# Patient Record
Sex: MALE | Race: WHITE | Employment: FULL TIME | URBAN - METROPOLITAN AREA
[De-identification: names, ages, dates, MRNs, and addresses within clinical notes are randomized per-mention and may not be internally consistent; named-entity substitution may affect disease eponyms.]

---

## 2022-05-30 ENCOUNTER — APPOINTMENT (OUTPATIENT)
Dept: CT IMAGING | Age: 52
DRG: 872 | End: 2022-05-30
Payer: COMMERCIAL

## 2022-05-30 ENCOUNTER — APPOINTMENT (OUTPATIENT)
Dept: GENERAL RADIOLOGY | Age: 52
DRG: 872 | End: 2022-05-30
Payer: COMMERCIAL

## 2022-05-30 ENCOUNTER — HOSPITAL ENCOUNTER (INPATIENT)
Age: 52
LOS: 3 days | Discharge: HOME | DRG: 872 | End: 2022-06-02
Attending: STUDENT IN AN ORGANIZED HEALTH CARE EDUCATION/TRAINING PROGRAM | Admitting: HOSPITALIST
Payer: COMMERCIAL

## 2022-05-30 DIAGNOSIS — N30.00 ACUTE CYSTITIS WITHOUT HEMATURIA: Primary | ICD-10-CM

## 2022-05-30 DIAGNOSIS — A41.9 SEPSIS WITHOUT ACUTE ORGAN DYSFUNCTION, DUE TO UNSPECIFIED ORGANISM (HCC): ICD-10-CM

## 2022-05-30 PROBLEM — N12 PYELONEPHRITIS: Status: ACTIVE | Noted: 2022-05-30

## 2022-05-30 LAB
A/G RATIO: 1.5 (ref 1.1–2.2)
ALBUMIN SERPL-MCNC: 4.4 G/DL (ref 3.4–5)
ALP BLD-CCNC: 80 U/L (ref 40–129)
ALT SERPL-CCNC: 14 U/L (ref 10–40)
ANION GAP SERPL CALCULATED.3IONS-SCNC: 12 MMOL/L (ref 3–16)
AST SERPL-CCNC: 14 U/L (ref 15–37)
BACTERIA: ABNORMAL /HPF
BASOPHILS ABSOLUTE: 0.1 K/UL (ref 0–0.2)
BASOPHILS RELATIVE PERCENT: 0.7 %
BILIRUB SERPL-MCNC: 1.2 MG/DL (ref 0–1)
BILIRUBIN URINE: NEGATIVE
BLOOD, URINE: ABNORMAL
BUN BLDV-MCNC: 10 MG/DL (ref 7–20)
CALCIUM SERPL-MCNC: 8.9 MG/DL (ref 8.3–10.6)
CHLORIDE BLD-SCNC: 105 MMOL/L (ref 99–110)
CLARITY: CLEAR
CO2: 20 MMOL/L (ref 21–32)
COLOR: YELLOW
CREAT SERPL-MCNC: 0.9 MG/DL (ref 0.9–1.3)
EOSINOPHILS ABSOLUTE: 0.1 K/UL (ref 0–0.6)
EOSINOPHILS RELATIVE PERCENT: 0.5 %
EPITHELIAL CELLS, UA: ABNORMAL /HPF (ref 0–5)
GFR AFRICAN AMERICAN: >60
GFR NON-AFRICAN AMERICAN: >60
GLUCOSE BLD-MCNC: 104 MG/DL (ref 70–99)
GLUCOSE URINE: NEGATIVE MG/DL
HCT VFR BLD CALC: 40.8 % (ref 40.5–52.5)
HEMOGLOBIN: 13.9 G/DL (ref 13.5–17.5)
INFLUENZA A: NOT DETECTED
INFLUENZA B: NOT DETECTED
KETONES, URINE: ABNORMAL MG/DL
LACTIC ACID, SEPSIS: 0.7 MMOL/L (ref 0.4–1.9)
LACTIC ACID: 1.7 MMOL/L (ref 0.4–2)
LEUKOCYTE ESTERASE, URINE: ABNORMAL
LIPASE: 24 U/L (ref 13–60)
LYMPHOCYTES ABSOLUTE: 0.9 K/UL (ref 1–5.1)
LYMPHOCYTES RELATIVE PERCENT: 6 %
MCH RBC QN AUTO: 28.9 PG (ref 26–34)
MCHC RBC AUTO-ENTMCNC: 34 G/DL (ref 31–36)
MCV RBC AUTO: 85.1 FL (ref 80–100)
MICROSCOPIC EXAMINATION: YES
MONOCYTES ABSOLUTE: 1 K/UL (ref 0–1.3)
MONOCYTES RELATIVE PERCENT: 6.7 %
NEUTROPHILS ABSOLUTE: 13.3 K/UL (ref 1.7–7.7)
NEUTROPHILS RELATIVE PERCENT: 86.1 %
NITRITE, URINE: NEGATIVE
PDW BLD-RTO: 13.2 % (ref 12.4–15.4)
PH UA: 7 (ref 5–8)
PLATELET # BLD: 147 K/UL (ref 135–450)
PMV BLD AUTO: 9.1 FL (ref 5–10.5)
POTASSIUM REFLEX MAGNESIUM: 3.8 MMOL/L (ref 3.5–5.1)
PROTEIN UA: ABNORMAL MG/DL
RBC # BLD: 4.8 M/UL (ref 4.2–5.9)
RBC UA: ABNORMAL /HPF (ref 0–4)
SARS-COV-2 RNA, RT PCR: NOT DETECTED
SODIUM BLD-SCNC: 137 MMOL/L (ref 136–145)
SPECIFIC GRAVITY UA: <=1.005 (ref 1–1.03)
TOTAL PROTEIN: 7.3 G/DL (ref 6.4–8.2)
URINE TYPE: ABNORMAL
UROBILINOGEN, URINE: 2 E.U./DL
WBC # BLD: 15.4 K/UL (ref 4–11)
WBC UA: ABNORMAL /HPF (ref 0–5)

## 2022-05-30 PROCEDURE — 83605 ASSAY OF LACTIC ACID: CPT

## 2022-05-30 PROCEDURE — 87186 SC STD MICRODIL/AGAR DIL: CPT

## 2022-05-30 PROCEDURE — 93005 ELECTROCARDIOGRAM TRACING: CPT | Performed by: NURSE PRACTITIONER

## 2022-05-30 PROCEDURE — 99285 EMERGENCY DEPT VISIT HI MDM: CPT

## 2022-05-30 PROCEDURE — 1200000000 HC SEMI PRIVATE

## 2022-05-30 PROCEDURE — 74177 CT ABD & PELVIS W/CONTRAST: CPT

## 2022-05-30 PROCEDURE — 6360000002 HC RX W HCPCS: Performed by: NURSE PRACTITIONER

## 2022-05-30 PROCEDURE — 2580000003 HC RX 258: Performed by: NURSE PRACTITIONER

## 2022-05-30 PROCEDURE — 87636 SARSCOV2 & INF A&B AMP PRB: CPT

## 2022-05-30 PROCEDURE — 6360000004 HC RX CONTRAST MEDICATION: Performed by: STUDENT IN AN ORGANIZED HEALTH CARE EDUCATION/TRAINING PROGRAM

## 2022-05-30 PROCEDURE — 85025 COMPLETE CBC W/AUTO DIFF WBC: CPT

## 2022-05-30 PROCEDURE — 81001 URINALYSIS AUTO W/SCOPE: CPT

## 2022-05-30 PROCEDURE — 83690 ASSAY OF LIPASE: CPT

## 2022-05-30 PROCEDURE — 96366 THER/PROPH/DIAG IV INF ADDON: CPT

## 2022-05-30 PROCEDURE — 80053 COMPREHEN METABOLIC PANEL: CPT

## 2022-05-30 PROCEDURE — 96365 THER/PROPH/DIAG IV INF INIT: CPT

## 2022-05-30 PROCEDURE — 6360000002 HC RX W HCPCS: Performed by: INTERNAL MEDICINE

## 2022-05-30 PROCEDURE — 87040 BLOOD CULTURE FOR BACTERIA: CPT

## 2022-05-30 PROCEDURE — 71045 X-RAY EXAM CHEST 1 VIEW: CPT

## 2022-05-30 PROCEDURE — 87077 CULTURE AEROBIC IDENTIFY: CPT

## 2022-05-30 PROCEDURE — 6370000000 HC RX 637 (ALT 250 FOR IP): Performed by: NURSE PRACTITIONER

## 2022-05-30 PROCEDURE — 36415 COLL VENOUS BLD VENIPUNCTURE: CPT

## 2022-05-30 PROCEDURE — 87086 URINE CULTURE/COLONY COUNT: CPT

## 2022-05-30 PROCEDURE — 2580000003 HC RX 258: Performed by: INTERNAL MEDICINE

## 2022-05-30 PROCEDURE — 6370000000 HC RX 637 (ALT 250 FOR IP): Performed by: INTERNAL MEDICINE

## 2022-05-30 RX ORDER — PROPRANOLOL HYDROCHLORIDE 60 MG/1
60 TABLET ORAL ONCE
COMMUNITY

## 2022-05-30 RX ORDER — LAMOTRIGINE 100 MG/1
100 TABLET ORAL DAILY
COMMUNITY

## 2022-05-30 RX ORDER — 0.9 % SODIUM CHLORIDE 0.9 %
30 INTRAVENOUS SOLUTION INTRAVENOUS ONCE
Status: COMPLETED | OUTPATIENT
Start: 2022-05-30 | End: 2022-05-30

## 2022-05-30 RX ORDER — M-VIT,TX,IRON,MINS/CALC/FOLIC 27MG-0.4MG
1 TABLET ORAL DAILY
Status: DISCONTINUED | OUTPATIENT
Start: 2022-05-30 | End: 2022-06-02 | Stop reason: HOSPADM

## 2022-05-30 RX ORDER — TRAMADOL HYDROCHLORIDE 50 MG/1
50 TABLET ORAL 2 TIMES DAILY
COMMUNITY

## 2022-05-30 RX ORDER — SODIUM CHLORIDE 0.9 % (FLUSH) 0.9 %
5-40 SYRINGE (ML) INJECTION EVERY 12 HOURS SCHEDULED
Status: DISCONTINUED | OUTPATIENT
Start: 2022-05-30 | End: 2022-06-02 | Stop reason: HOSPADM

## 2022-05-30 RX ORDER — LORATADINE 10 MG/1
10 CAPSULE, LIQUID FILLED ORAL DAILY
COMMUNITY

## 2022-05-30 RX ORDER — DICYCLOMINE HCL 20 MG
20 TABLET ORAL EVERY 6 HOURS
COMMUNITY

## 2022-05-30 RX ORDER — MONTELUKAST SODIUM 10 MG/1
10 TABLET ORAL NIGHTLY
COMMUNITY

## 2022-05-30 RX ORDER — TOPIRAMATE 25 MG/1
50 TABLET ORAL DAILY
Status: DISCONTINUED | OUTPATIENT
Start: 2022-05-30 | End: 2022-06-02 | Stop reason: HOSPADM

## 2022-05-30 RX ORDER — ONDANSETRON 4 MG/1
4 TABLET, ORALLY DISINTEGRATING ORAL EVERY 8 HOURS PRN
Status: DISCONTINUED | OUTPATIENT
Start: 2022-05-30 | End: 2022-06-02 | Stop reason: HOSPADM

## 2022-05-30 RX ORDER — MECLIZINE HYDROCHLORIDE 25 MG/1
25 TABLET ORAL 3 TIMES DAILY PRN
COMMUNITY

## 2022-05-30 RX ORDER — ACETAMINOPHEN 325 MG/1
650 TABLET ORAL EVERY 6 HOURS PRN
Status: DISCONTINUED | OUTPATIENT
Start: 2022-05-30 | End: 2022-06-02 | Stop reason: HOSPADM

## 2022-05-30 RX ORDER — ENOXAPARIN SODIUM 100 MG/ML
40 INJECTION SUBCUTANEOUS DAILY
Status: DISCONTINUED | OUTPATIENT
Start: 2022-05-30 | End: 2022-06-02 | Stop reason: HOSPADM

## 2022-05-30 RX ORDER — TRAMADOL HYDROCHLORIDE 50 MG/1
50 TABLET ORAL EVERY 6 HOURS PRN
Status: DISCONTINUED | OUTPATIENT
Start: 2022-05-30 | End: 2022-06-02 | Stop reason: HOSPADM

## 2022-05-30 RX ORDER — POLYETHYLENE GLYCOL 3350 17 G/17G
17 POWDER, FOR SOLUTION ORAL DAILY PRN
Status: DISCONTINUED | OUTPATIENT
Start: 2022-05-30 | End: 2022-06-02 | Stop reason: HOSPADM

## 2022-05-30 RX ORDER — ACETAMINOPHEN 650 MG/1
650 SUPPOSITORY RECTAL EVERY 6 HOURS PRN
Status: DISCONTINUED | OUTPATIENT
Start: 2022-05-30 | End: 2022-06-02 | Stop reason: HOSPADM

## 2022-05-30 RX ORDER — ACETAMINOPHEN 500 MG
1000 TABLET ORAL ONCE
Status: COMPLETED | OUTPATIENT
Start: 2022-05-30 | End: 2022-05-30

## 2022-05-30 RX ORDER — LEVOFLOXACIN 5 MG/ML
750 INJECTION, SOLUTION INTRAVENOUS EVERY 24 HOURS
Status: DISCONTINUED | OUTPATIENT
Start: 2022-05-30 | End: 2022-06-02

## 2022-05-30 RX ORDER — ONDANSETRON 2 MG/ML
4 INJECTION INTRAMUSCULAR; INTRAVENOUS EVERY 6 HOURS PRN
Status: DISCONTINUED | OUTPATIENT
Start: 2022-05-30 | End: 2022-06-02 | Stop reason: HOSPADM

## 2022-05-30 RX ORDER — TAMSULOSIN HYDROCHLORIDE 0.4 MG/1
0.4 CAPSULE ORAL DAILY
COMMUNITY

## 2022-05-30 RX ORDER — DICYCLOMINE HCL 20 MG
20 TABLET ORAL 4 TIMES DAILY PRN
Status: DISCONTINUED | OUTPATIENT
Start: 2022-05-30 | End: 2022-06-02 | Stop reason: HOSPADM

## 2022-05-30 RX ORDER — SODIUM CHLORIDE 9 MG/ML
INJECTION, SOLUTION INTRAVENOUS CONTINUOUS
Status: ACTIVE | OUTPATIENT
Start: 2022-05-30 | End: 2022-05-31

## 2022-05-30 RX ORDER — SODIUM CHLORIDE 9 MG/ML
INJECTION, SOLUTION INTRAVENOUS PRN
Status: DISCONTINUED | OUTPATIENT
Start: 2022-05-30 | End: 2022-06-02 | Stop reason: HOSPADM

## 2022-05-30 RX ORDER — SODIUM CHLORIDE 0.9 % (FLUSH) 0.9 %
5-40 SYRINGE (ML) INJECTION PRN
Status: DISCONTINUED | OUTPATIENT
Start: 2022-05-30 | End: 2022-06-02 | Stop reason: HOSPADM

## 2022-05-30 RX ORDER — MONTELUKAST SODIUM 10 MG/1
10 TABLET ORAL NIGHTLY
Status: DISCONTINUED | OUTPATIENT
Start: 2022-05-30 | End: 2022-06-02 | Stop reason: HOSPADM

## 2022-05-30 RX ORDER — TOPIRAMATE 25 MG/1
50 TABLET ORAL DAILY
COMMUNITY

## 2022-05-30 RX ORDER — TAMSULOSIN HYDROCHLORIDE 0.4 MG/1
0.4 CAPSULE ORAL DAILY
Status: DISCONTINUED | OUTPATIENT
Start: 2022-05-30 | End: 2022-06-02 | Stop reason: HOSPADM

## 2022-05-30 RX ORDER — M-VIT,TX,IRON,MINS/CALC/FOLIC 27MG-0.4MG
1 TABLET ORAL DAILY
COMMUNITY

## 2022-05-30 RX ADMIN — ENOXAPARIN SODIUM 40 MG: 100 INJECTION SUBCUTANEOUS at 18:55

## 2022-05-30 RX ADMIN — MULTIPLE VITAMINS W/ MINERALS TAB 1 TABLET: TAB at 18:55

## 2022-05-30 RX ADMIN — Medication 10 ML: at 18:44

## 2022-05-30 RX ADMIN — TAMSULOSIN HYDROCHLORIDE 0.4 MG: 0.4 CAPSULE ORAL at 18:55

## 2022-05-30 RX ADMIN — IOPAMIDOL 75 ML: 755 INJECTION, SOLUTION INTRAVENOUS at 15:28

## 2022-05-30 RX ADMIN — PIPERACILLIN AND TAZOBACTAM 3375 MG: 3; .375 INJECTION, POWDER, FOR SOLUTION INTRAVENOUS at 15:27

## 2022-05-30 RX ADMIN — SODIUM CHLORIDE: 9 INJECTION, SOLUTION INTRAVENOUS at 18:47

## 2022-05-30 RX ADMIN — ACETAMINOPHEN 1000 MG: 500 TABLET ORAL at 15:07

## 2022-05-30 RX ADMIN — MONTELUKAST SODIUM 10 MG: 10 TABLET, COATED ORAL at 20:11

## 2022-05-30 RX ADMIN — TRAMADOL HYDROCHLORIDE 50 MG: 50 TABLET ORAL at 18:55

## 2022-05-30 RX ADMIN — TOPIRAMATE 50 MG: 25 TABLET, FILM COATED ORAL at 18:55

## 2022-05-30 RX ADMIN — SODIUM CHLORIDE 2319 ML: 9 INJECTION, SOLUTION INTRAVENOUS at 15:04

## 2022-05-30 RX ADMIN — LEVOFLOXACIN 750 MG: 5 INJECTION, SOLUTION INTRAVENOUS at 18:57

## 2022-05-30 ASSESSMENT — PAIN SCALES - GENERAL
PAINLEVEL_OUTOF10: 9
PAINLEVEL_OUTOF10: 7
PAINLEVEL_OUTOF10: 8

## 2022-05-30 ASSESSMENT — PAIN DESCRIPTION - DESCRIPTORS: DESCRIPTORS: DULL;SHOOTING

## 2022-05-30 ASSESSMENT — LIFESTYLE VARIABLES
HOW OFTEN DO YOU HAVE A DRINK CONTAINING ALCOHOL: NEVER
HOW OFTEN DO YOU HAVE A DRINK CONTAINING ALCOHOL: NEVER

## 2022-05-30 ASSESSMENT — PAIN DESCRIPTION - LOCATION
LOCATION: ABDOMEN
LOCATION: ABDOMEN

## 2022-05-30 ASSESSMENT — PAIN - FUNCTIONAL ASSESSMENT
PAIN_FUNCTIONAL_ASSESSMENT: 0-10
PAIN_FUNCTIONAL_ASSESSMENT: PREVENTS OR INTERFERES SOME ACTIVE ACTIVITIES AND ADLS

## 2022-05-30 ASSESSMENT — ENCOUNTER SYMPTOMS
SHORTNESS OF BREATH: 0
NAUSEA: 0
EYE PAIN: 0
BACK PAIN: 0
VOMITING: 0
ABDOMINAL PAIN: 0
DIARRHEA: 0
RHINORRHEA: 0
BLOOD IN STOOL: 0
COUGH: 0
SORE THROAT: 0

## 2022-05-30 ASSESSMENT — PAIN DESCRIPTION - ORIENTATION: ORIENTATION: RIGHT;MID;LOWER

## 2022-05-30 ASSESSMENT — PAIN DESCRIPTION - FREQUENCY: FREQUENCY: CONTINUOUS

## 2022-05-30 ASSESSMENT — PAIN DESCRIPTION - PAIN TYPE: TYPE: ACUTE PAIN

## 2022-05-30 ASSESSMENT — PAIN DESCRIPTION - ONSET: ONSET: ON-GOING

## 2022-05-30 NOTE — PLAN OF CARE
Problem: Discharge Planning  Goal: Discharge to home or other facility with appropriate resources  5/30/2022 1958 by Molly Arreaga RN  Outcome: Progressing  5/30/2022 1841 by Daphnie Johnson RN  Outcome: Progressing  Flowsheets  Taken 5/30/2022 1841  Discharge to home or other facility with appropriate resources:   Identify discharge learning needs (meds, wound care, etc)   Identify barriers to discharge with patient and caregiver   Refer to discharge planning if patient needs post-hospital services based on physician order or complex needs related to functional status, cognitive ability or social support system   Arrange for needed discharge resources and transportation as appropriate   Arrange for interpreters to assist at discharge as needed  Taken 5/30/2022 1815  Discharge to home or other facility with appropriate resources:   Identify barriers to discharge with patient and caregiver   Arrange for needed discharge resources and transportation as appropriate   Identify discharge learning needs (meds, wound care, etc)   Arrange for interpreters to assist at discharge as needed   Refer to discharge planning if patient needs post-hospital services based on physician order or complex needs related to functional status, cognitive ability or social support system     Problem: Pain  Goal: Verbalizes/displays adequate comfort level or baseline comfort level  5/30/2022 1958 by Molly Arreaga RN  Outcome: Progressing  5/30/2022 1841 by Daphnie Johnson RN  Outcome: Progressing

## 2022-05-30 NOTE — PLAN OF CARE
Problem: Discharge Planning  Goal: Discharge to home or other facility with appropriate resources  Outcome: Progressing  Flowsheets  Taken 5/30/2022 1841  Discharge to home or other facility with appropriate resources:   Identify discharge learning needs (meds, wound care, etc)   Identify barriers to discharge with patient and caregiver   Refer to discharge planning if patient needs post-hospital services based on physician order or complex needs related to functional status, cognitive ability or social support system   Arrange for needed discharge resources and transportation as appropriate   Arrange for interpreters to assist at discharge as needed  Taken 5/30/2022 1815  Discharge to home or other facility with appropriate resources:   Identify barriers to discharge with patient and caregiver   Arrange for needed discharge resources and transportation as appropriate   Identify discharge learning needs (meds, wound care, etc)   Arrange for interpreters to assist at discharge as needed   Refer to discharge planning if patient needs post-hospital services based on physician order or complex needs related to functional status, cognitive ability or social support system     Problem: Pain  Goal: Verbalizes/displays adequate comfort level or baseline comfort level  Outcome: Progressing

## 2022-05-30 NOTE — ED PROVIDER NOTES
pain, rhinorrhea and sore throat. Eyes: Negative for pain and visual disturbance. Respiratory: Negative for cough and shortness of breath. Cardiovascular: Negative for chest pain and leg swelling. Gastrointestinal: Negative for abdominal pain, blood in stool, diarrhea, nausea and vomiting. Genitourinary: Positive for dysuria and flank pain. Negative for difficulty urinating and frequency. Musculoskeletal: Negative for back pain and neck pain. Skin: Negative for rash and wound. Neurological: Negative for dizziness and light-headedness. PAST MEDICAL HISTORY     Past Medical History:   Diagnosis Date    Asthma     Bipolar disorder ([facility] [address]     Migraines     Osteoarthritis     Prostate irregularity     enlarged    Sacral radiculitis     Seizures ([facility] [address]     as a child       SURGICAL HISTORY   History reviewed. No pertinent surgical history. [address]       Current Discharge Medication List      CONTINUE these medications which have NOT CHANGED    Details   traMADol (ULTRAM) 50 MG tablet Take 50 mg by mouth in the morning and at bedtime.  Shoulder pain      tamsulosin (FLOMAX) 0.4 mg capsule Take 0.4 mg by mouth daily prostate      loratadine (CLARITIN) 10 MG capsule Take 10 mg by mouth daily      montelukast (SINGULAIR) 10 MG tablet Take 10 mg by mouth nightly      Multiple Vitamins-Minerals (THERAPEUTIC MULTIVITAMIN-MINERALS) tablet Take 1 tablet by mouth daily      Misc Natural Products (GLUCOS-CHONDROIT-MSM COMPLEX PO) Take by mouth      dicyclomine (BENTYL) 20 MG tablet Take 20 mg by mouth every 6 hours      topiramate (TOPAMAX) 25 MG tablet Take 50 mg by mouth daily      propranolol (INDERAL) 60 MG tablet Take 60 mg by mouth once Migraines      meclizine (ANTIVERT) 25 MG tablet Take 25 mg by mouth 3 times daily as needed      lamoTRIgine (LAMICTAL) 100 MG tablet Take 100 mg by mouth daily For bipolar disorder             ALLERGIES     Aspirin, Hartwell-d [diphenhydramine], Mucinex [guaifenesin er], Naproxen, Voltaren [diclofenac], and Zyrtec [cetirizine]    FAMILYHISTORY     History reviewed. No pertinent family history. SOCIAL HISTORY       Social History     Socioeconomic History    Marital status:      Spouse name: None    Number of children: None    Years of education: None    Highest education level: None   Occupational History    None   Tobacco Use    Smoking status: Never Smoker    Smokeless tobacco: Never Used   Substance and Sexual Activity    Alcohol use: Never    Drug use: Never    Sexual activity: Yes     Partners: Female   Other Topics Concern    None   Social History Narrative    None     Social Determinants of Health     Financial Resource Strain:     Difficulty of Paying Living Expenses: Not on file   Food Insecurity:     Worried About Running Out of Food in the Last Year: Not on file    Dustin of Food in the Last Year: Not on file   Transportation Needs:     Lack of Transportation (Medical): Not on file    Lack of Transportation (Non-Medical):  Not on file   Physical Activity:     Days of Exercise per Week: Not on file    Minutes of Exercise per Session: Not on file   Stress:     Feeling of Stress : Not on file   Social Connections:     Frequency of Communication with Friends and Family: Not on file    Frequency of Social Gatherings with Friends and Family: Not on file    Attends Zoroastrian Services: Not on file    Active Member of [address] [place] [address] or Organizations: Not on file    Attends Club or Organization Meetings: Not on file    Marital Status: Not on file   Intimate Partner Violence:     Fear of Current or Ex-Partner: Not on file    Emotionally Abused: Not on file    Physically Abused: Not on file    Sexually Abused: Not on file   Housing Stability:     Unable to Pay for Housing in the Last Year: Not on file    Number of [place] in the Last Year: Not on file    Unstable Housing in the Last Year: Not on file       SCREENINGS [place] Coma Scale  Eye Opening: Spontaneous  Best Verbal Response: Oriented  Best Motor Response: Obeys commands  [place] Coma Scale Score: 15        PHYSICAL EXAM    (up to 7 for level 4, 8 or more for level 5)     ED Triage Vitals [05/30/22 1426]   BP Temp Temp src Heart Rate Resp SpO2 Height Weight   119/81 99 °F (37.2 °C) -- (!) 127 18 99 % 5' 6\" (1.676 m) 215 lb (97.5 kg)       Physical Exam  Vitals and nursing note reviewed. Constitutional:       Appearance: Normal appearance. He is not toxic-appearing or diaphoretic. HENT:      Head: Normocephalic and atraumatic. Nose: Nose normal.   Eyes:      General:         Right eye: No discharge. Left eye: No discharge. Cardiovascular:      Rate and Rhythm: Regular rhythm. Tachycardia present. Heart sounds: Normal heart sounds. No murmur heard. Pulmonary:      Effort: Pulmonary effort is normal. No respiratory distress. Breath sounds: No wheezing, rhonchi or rales. Abdominal:      General: Abdomen is flat. Bowel sounds are normal. There is no distension. Palpations: Abdomen is soft. Tenderness: There is abdominal tenderness in the right lower quadrant. There is no rebound. Negative signs include Bansal's sign and McBurney's sign. Musculoskeletal:         General: Normal range of motion. Cervical back: Normal range of motion and neck supple. Skin:     General: Skin is warm and dry. Capillary Refill: Capillary refill takes less than 2 seconds. Neurological:      General: No focal deficit present. Mental Status: He is alert and oriented to person, place, and time.    Psychiatric:         Mood and Affect: Mood normal.         Behavior: Behavior normal.         DIAGNOSTIC RESULTS   LABS:    Labs Reviewed   CBC WITH AUTO DIFFERENTIAL - Abnormal; Notable for the following components:       Result Value    WBC 15.4 (*)     Neutrophils Absolute 13.3 (*)     Lymphocytes Absolute 0.9 (*)     All other components within normal limits   COMPREHENSIVE METABOLIC PANEL W/ REFLEX TO MG FOR LOW K - Abnormal; Notable for the following components:    CO2 20 (*)     Glucose 104 (*)     Total Bilirubin 1.2 (*)     AST 14 (*)     All other components within normal limits   URINALYSIS WITH MICROSCOPIC - Abnormal; Notable for the following components:    Ketones, Urine TRACE (*)     Blood, Urine SMALL (*)     Protein, UA TRACE (*)     Urobilinogen, Urine 2.0 (*)     Leukocyte Esterase, Urine SMALL (*)     WBC, UA  (*)     RBC, UA 5-10 (*)     Bacteria, UA 3+ (*)     All other components within normal limits   COVID-19 & INFLUENZA COMBO   CULTURE, URINE   CULTURE, BLOOD 1   CULTURE, BLOOD 2   LACTATE, SEPSIS   LIPASE   CBC WITH AUTO DIFFERENTIAL   RENAL FUNCTION PANEL   LACTIC ACID   LACTIC ACID       When ordered, only abnormal lab results are displayed. All other labs were within normal range or not returned as of this dictation. EKG: When ordered, EKG's are interpreted by the Emergency Department Physician in the absence of a cardiologist.  Please see their note for interpretation of EKG. RADIOLOGY:   Non-plain film images such as CT, Ultrasound and MRI are read by the radiologist. Plain radiographic images are visualized andpreliminarily interpreted by the  ED Provider with the below findings:        Interpretation perthe Radiologist below, if available at the time of this note:    CT ABDOMEN PELVIS W IV CONTRAST Additional Contrast? None   Final Result   Mild bladder wall thickening with surrounding fat stranding, suggesting   underlying cystitis. No stones or hydronephrosis on the right or left      Diverticulosis. No diverticulitis. Appendix is normal in caliber         XR CHEST PORTABLE   Final Result   No significant findings in the chest.           No results found.       PROCEDURES   Unless otherwise noted below, none     Procedures    CRITICAL CARE TIME   N/A    CONSULTS:  IP CONSULT TO HOSPITALIST  IP CONSULT TO UROLOGY      EMERGENCY DEPARTMENT COURSE and DIFFERENTIAL DIAGNOSIS/MDM:   Vitals:    Vitals:    05/30/22 1715 05/30/22 1730 05/30/22 1815 05/30/22 1921   BP: 112/67 107/71 112/65 122/72   Pulse: 96 (!) 105 98 96   Resp: 18 17 16 16   Temp:   97.9 °F (36.6 °C) 98.5 °F (36.9 °C)   TempSrc:   Oral Oral   SpO2: 98% 96% 98% 97%   Weight:   218 lb 3.2 oz (99 kg)    Height:   5' 6\" (1.676 m)        Patient was given thefollowing medications:  Medications   dicyclomine (BENTYL) tablet 20 mg (has no administration in time range)   montelukast (SINGULAIR) tablet 10 mg (10 mg Oral Given 5/30/22 2011)   therapeutic multivitamin-minerals 1 tablet (1 tablet Oral Given 5/30/22 1855)   tamsulosin (FLOMAX) capsule 0.4 mg (0.4 mg Oral Given 5/30/22 1855)   topiramate (TOPAMAX) tablet 50 mg (50 mg Oral Given 5/30/22 1855)   traMADol (ULTRAM) tablet 50 mg (50 mg Oral Given 5/30/22 1855)   levoFLOXacin (LEVAQUIN) 750 MG/150ML infusion 750 mg (750 mg IntraVENous New Bag 5/30/22 1857)   sodium chloride flush 0.9 % injection 5-40 mL (10 mLs IntraVENous Not Given 5/30/22 2011)   sodium chloride flush 0.9 % injection 5-40 mL (10 mLs IntraVENous Given 5/30/22 1844)   0.9 % sodium chloride infusion (has no administration in time range)   enoxaparin (LOVENOX) injection 40 mg (40 mg SubCUTAneous Given 5/30/22 1855)   ondansetron (ZOFRAN-ODT) disintegrating tablet 4 mg (has no administration in time range)     Or   ondansetron (ZOFRAN) injection 4 mg (has no administration in time range)   polyethylene glycol (GLYCOLAX) packet 17 g (has no administration in time range)   acetaminophen (TYLENOL) tablet 650 mg (has no administration in time range)     Or   acetaminophen (TYLENOL) suppository 650 mg (has no administration in time range)   0.9 % sodium chloride infusion ( IntraVENous New Bag 5/30/22 4704)   0.9 % sodium chloride IV bolus 2,319 mL (0 mLs IntraVENous Stopped 5/30/22 5965)   acetaminophen (TYLENOL) tablet 1,000 mg (1,000 mg Oral Given 5/30/22 1507)   piperacillin-tazobactam (ZOSYN) 3,375 mg in sodium chloride 0.9 % 100 mL IVPB (mini-bag) (0 mg IntraVENous Stopped 5/30/22 1755)   iopamidol (ISOVUE-370) 76 % injection 75 mL (75 mLs IntraVENous Given 5/30/22 1528)         Is this patient to be included in the SEP-1 Core Measure due to severe sepsis or septic shock? No   Exclusion criteria - the patient is NOT to be included for SEP-1 Core Measure due to:  2+ SIRS criteria are not met    Patient be admitted to the hospital service for SIRS criteria and acute cystitis. The patient appears well nontoxic. No other acute complaints at this time. Patient does not have severe sepsis. Patient is hemodynamically stable. Patient has had improvement with IV fluids. Patient was treated with Zosyn for the cystitis. Patient further testing appears to be benign. Patient does have mild leukocytosis but no sign of elevated lactic. Patient chest x-ray was negative. Patient is to be admitted and currently hospitalist was evaluating patient for admission. FINAL IMPRESSION      1. Acute cystitis without hematuria    2. Sepsis without acute organ dysfunction, due to unspecified organism [facility])          DISPOSITION/PLAN   DISPOSITION Admitted 05/30/2022 05:28:08 PM      PATIENT REFERREDTO:  No follow-up provider specified.     DISCHARGE MEDICATIONS:  Current Discharge Medication List          DISCONTINUED MEDICATIONS:  Current Discharge Medication List                 (Please note that portions ofthis note were completed with a voice recognition program.  Efforts were made to edit the dictations but occasionally words are mis-transcribed.)    LAST Peralta CNP (electronically signed)            LAST Peralta CNP  05/30/22 1740

## 2022-05-30 NOTE — ED PROVIDER NOTES
I independently performed a history and physical on Delores Foote. All diagnostic, treatment, and disposition decisions were made by myself in conjunction with the advanced practice provider/resident. Labs Reviewed   CBC WITH AUTO DIFFERENTIAL - Abnormal; Notable for the following components:       Result Value    WBC 15.4 (*)     Neutrophils Absolute 13.3 (*)     Lymphocytes Absolute 0.9 (*)     All other components within normal limits   COMPREHENSIVE METABOLIC PANEL W/ REFLEX TO MG FOR LOW K - Abnormal; Notable for the following components:    CO2 20 (*)     Glucose 104 (*)     Total Bilirubin 1.2 (*)     AST 14 (*)     All other components within normal limits   URINALYSIS WITH MICROSCOPIC - Abnormal; Notable for the following components:    Ketones, Urine TRACE (*)     Blood, Urine SMALL (*)     Protein, UA TRACE (*)     Urobilinogen, Urine 2.0 (*)     Leukocyte Esterase, Urine SMALL (*)     WBC, UA  (*)     RBC, UA 5-10 (*)     Bacteria, UA 3+ (*)     All other components within normal limits   COVID-19 & INFLUENZA COMBO   CULTURE, URINE   CULTURE, BLOOD 1   CULTURE, BLOOD 2   LACTATE, SEPSIS   LIPASE   CBC WITH AUTO DIFFERENTIAL   RENAL FUNCTION PANEL   LACTIC ACID   LACTIC ACID     CT ABDOMEN PELVIS W IV CONTRAST Additional Contrast? None   Final Result   Mild bladder wall thickening with surrounding fat stranding, suggesting   underlying cystitis. No stones or hydronephrosis on the right or left      Diverticulosis. No diverticulitis. Appendix is normal in caliber         XR CHEST PORTABLE   Final Result   No significant findings in the chest.           For further details of [facility] emergency department encounter, please see the CHAPARRITA/resident's documentation. I personally saw the patient and performed a substantive portion of the visit including all aspects of the medical decision making.  Briefly, this is a 52-year-old male, presenting with several day history of chills, fever of 102 at home yesterday, right-sided abdominal and flank pain, and dysuria. Patient did meet sepsis criteria, was started on antibiotics with sepsis protocol and fluids. UA did show evidence of infection. CT abdomen pelvis suggesting cystitis. Patient will be hospitalized for further work-up and treatment of his condition. He is comfortable in agreement with plan of care. 1. Acute cystitis without hematuria    2. Sepsis without acute organ dysfunction, due to unspecified organism [facility])        I personally saw the patient and independently provided 13 minutes of non-concurrent critical care out of the total shared critical care time provided. Comment: Please note this report has been produced using speech recognition software and may contain errors related to that system including errors in grammar, punctuation, and spelling, as well as words and phrases that may be inappropriate. If there are any questions or concerns please feel free to contact the dictating provider for clarification.        Jose Alejandro Caputo MD  05/30/22 3950

## 2022-05-30 NOTE — PLAN OF CARE
55yo man with R flank pain, fever and significant dysuria. No prior Hx of UTI. CT shows no  obstruction. UA consistent with UTI and also with microscopic hematuria. Admit to med surg with early sepsis (WBC, fever) and suspected R pyelonephritis. Levaquin IV Pyelo dosing. Urology consult for tomorrow am - male, uti, no prior history - may need further non emergent evaluation with cystoscopy.        Elena Rodriguez MD  5/30/2022  5:31 PM

## 2022-05-30 NOTE — LETTER
[address] [id number]  Phone: [phone]             June 2, 2022    Patient: Perez Mcneal   YOB: 1970   Date of Visit: 5/30/2022       To Whom It May Concern:    Perez Mcneal was seen and treated in our facility  beginning 5/30/2022 until 6/2/22. He may return to work on 6/6/22.       Sincerely,       Jennifer Xiao RN         Signature:__________________________________

## 2022-05-30 NOTE — PROGRESS NOTES
Patient admitted to room 207 from ER. Patient oriented to room, call light, bed rails, phone, lights and bathroom. Patient instructed about the schedule of the day including: vital sign frequency, lab draws, possible tests, frequency of MD and staff rounds, daily weights, I &O's and prescribed diet. . Telemetry box in place, patient aware of placement and reason. Bed locked, in lowest position, side rails up 2/4, call light within reach. 4 Eyes Skin Assessment     The patient is being assess for   Admission      I agree that 2 RN's have performed a thorough Head to Toe Skin Assessment on the patient. ALL assessment sites listed below have been assessed. Areas assessed for pressure by both nurses:   [x]   Head, Face, and Ears   [x]   Shoulders, Back, and Chest, Abdomen  [x]   Arms, Elbows, and Hands   [x]   Coccyx, Sacrum, and Ischium  [x]   Legs, Feet, and Heels        Skin Assessed Under all Medical Devices by both nurses:  N/A              All Mepilex Borders were peeled back and area peeked at by both nurses:  No: N/A  Please list where Mepilex Borders are located:  N/A             **SHARE this note so that the co-signing nurse is able to place an eSignature**    Co-signer eSignature: Electronically signed by Audi Argueta RN on 5/30/22 at 7:04 PM EDT    Does the Patient have Skin Breakdown related to pressure?   No     (Insert Photo here)         Jim Prevention initiated:  NA   Wound Care Orders initiated:  NA      [facility] nurse consulted for Pressure Injury (Stage 3,4, Unstageable, DTI, NWPT, Complex wounds)and New or Established Ostomies:  NA      Primary Nurse eSignature: Electronically signed by Gem Messina RN on 5/30/22 at 7:03 PM EDT

## 2022-05-30 NOTE — FLOWSHEET NOTE
05/30/22 1815   Vital Signs   Temp 97.9 °F (36.6 °C)   Temp Source Oral   Heart Rate 98   Heart Rate Source Monitor   Resp 16   /65   BP Location Right upper arm   MAP (Calculated) 80.67   Patient Position Semi fowlers   Level of Consciousness Alert (0)   MEWS Score 1   Pain Assessment   Pain Assessment 0-10   Pain Level 8   Patient's Stated Pain Goal 3   Pain Location Abdomen   Pain Orientation Right;Mid;Lower   Pain Descriptors Dull; Shooting   Functional Pain Assessment Prevents or interferes some active activities and ADLs   Pain Type Acute pain   Pain Frequency Continuous   Pain Onset On-going   Non-Pharmaceutical Pain Intervention(s) Declines   Multiple Pain Sites No   Opioid-Induced Sedation   POSS Score 1   RASS   Walker Agitation Sedation Scale (RASS) 0   Oxygen Therapy   SpO2 98 %   Pulse Oximeter Device Mode Intermittent   Pulse Oximeter Device Location Right;Finger   O2 Device None (Room air)   Height and Weight   Height 5' 6\" (1.676 m)   Weight 218 lb 3.2 oz (99 kg)   Weight Method Bed scale   BSA (Calculated - sq m) 2.15 sq meters   BMI (Calculated) 35.3     Assessment completed and vital signs completed, see flowsheet. Vital signs are WNL. Patient is alert & oriented. No complaints voiced. Patient denies further needs. Side rails up X 2. Bed in the lowest position. Call light within reach.

## 2022-05-31 LAB
ALBUMIN SERPL-MCNC: 3.7 G/DL (ref 3.4–5)
ANION GAP SERPL CALCULATED.3IONS-SCNC: 11 MMOL/L (ref 3–16)
BASOPHILS ABSOLUTE: 0 K/UL (ref 0–0.2)
BASOPHILS RELATIVE PERCENT: 0.2 %
BUN BLDV-MCNC: 7 MG/DL (ref 7–20)
CALCIUM SERPL-MCNC: 8.4 MG/DL (ref 8.3–10.6)
CHLORIDE BLD-SCNC: 109 MMOL/L (ref 99–110)
CO2: 21 MMOL/L (ref 21–32)
CREAT SERPL-MCNC: 0.8 MG/DL (ref 0.9–1.3)
EKG ATRIAL RATE: 116 BPM
EKG DIAGNOSIS: NORMAL
EKG P AXIS: 52 DEGREES
EKG P-R INTERVAL: 140 MS
EKG Q-T INTERVAL: 310 MS
EKG QRS DURATION: 76 MS
EKG QTC CALCULATION (BAZETT): 430 MS
EKG R AXIS: 64 DEGREES
EKG T AXIS: -8 DEGREES
EKG VENTRICULAR RATE: 116 BPM
EOSINOPHILS ABSOLUTE: 0.2 K/UL (ref 0–0.6)
EOSINOPHILS RELATIVE PERCENT: 2.1 %
GFR AFRICAN AMERICAN: >60
GFR NON-AFRICAN AMERICAN: >60
GLUCOSE BLD-MCNC: 89 MG/DL (ref 70–99)
HCT VFR BLD CALC: 37.7 % (ref 40.5–52.5)
HEMOGLOBIN: 12.5 G/DL (ref 13.5–17.5)
LACTIC ACID: 0.6 MMOL/L (ref 0.4–2)
LYMPHOCYTES ABSOLUTE: 1 K/UL (ref 1–5.1)
LYMPHOCYTES RELATIVE PERCENT: 9.6 %
MCH RBC QN AUTO: 29 PG (ref 26–34)
MCHC RBC AUTO-ENTMCNC: 33.3 G/DL (ref 31–36)
MCV RBC AUTO: 87.2 FL (ref 80–100)
MONOCYTES ABSOLUTE: 0.8 K/UL (ref 0–1.3)
MONOCYTES RELATIVE PERCENT: 7.6 %
NEUTROPHILS ABSOLUTE: 8.1 K/UL (ref 1.7–7.7)
NEUTROPHILS RELATIVE PERCENT: 80.5 %
PDW BLD-RTO: 13.3 % (ref 12.4–15.4)
PHOSPHORUS: 1.8 MG/DL (ref 2.5–4.9)
PLATELET # BLD: 141 K/UL (ref 135–450)
PMV BLD AUTO: 9.3 FL (ref 5–10.5)
POTASSIUM SERPL-SCNC: 4 MMOL/L (ref 3.5–5.1)
RBC # BLD: 4.32 M/UL (ref 4.2–5.9)
SODIUM BLD-SCNC: 141 MMOL/L (ref 136–145)
WBC # BLD: 10.1 K/UL (ref 4–11)

## 2022-05-31 PROCEDURE — 1200000000 HC SEMI PRIVATE

## 2022-05-31 PROCEDURE — 80069 RENAL FUNCTION PANEL: CPT

## 2022-05-31 PROCEDURE — 93010 ELECTROCARDIOGRAM REPORT: CPT | Performed by: INTERNAL MEDICINE

## 2022-05-31 PROCEDURE — 99232 SBSQ HOSP IP/OBS MODERATE 35: CPT | Performed by: INTERNAL MEDICINE

## 2022-05-31 PROCEDURE — 6360000002 HC RX W HCPCS: Performed by: INTERNAL MEDICINE

## 2022-05-31 PROCEDURE — 2580000003 HC RX 258: Performed by: INTERNAL MEDICINE

## 2022-05-31 PROCEDURE — 85025 COMPLETE CBC W/AUTO DIFF WBC: CPT

## 2022-05-31 PROCEDURE — 6370000000 HC RX 637 (ALT 250 FOR IP): Performed by: INTERNAL MEDICINE

## 2022-05-31 PROCEDURE — 36415 COLL VENOUS BLD VENIPUNCTURE: CPT

## 2022-05-31 PROCEDURE — 6370000000 HC RX 637 (ALT 250 FOR IP): Performed by: UROLOGY

## 2022-05-31 PROCEDURE — 83605 ASSAY OF LACTIC ACID: CPT

## 2022-05-31 RX ORDER — FINASTERIDE 5 MG/1
5 TABLET, FILM COATED ORAL DAILY
Status: DISCONTINUED | OUTPATIENT
Start: 2022-05-31 | End: 2022-06-02 | Stop reason: HOSPADM

## 2022-05-31 RX ORDER — BETAMETHASONE DIPROPIONATE 0.5 MG/G
CREAM TOPICAL 2 TIMES DAILY
Status: DISCONTINUED | OUTPATIENT
Start: 2022-05-31 | End: 2022-06-02 | Stop reason: HOSPADM

## 2022-05-31 RX ADMIN — ACETAMINOPHEN 650 MG: 325 TABLET ORAL at 14:55

## 2022-05-31 RX ADMIN — TRAMADOL HYDROCHLORIDE 50 MG: 50 TABLET ORAL at 10:31

## 2022-05-31 RX ADMIN — ACETAMINOPHEN 650 MG: 325 TABLET ORAL at 21:04

## 2022-05-31 RX ADMIN — LEVOFLOXACIN 750 MG: 5 INJECTION, SOLUTION INTRAVENOUS at 19:46

## 2022-05-31 RX ADMIN — FINASTERIDE 5 MG: 5 TABLET, FILM COATED ORAL at 10:27

## 2022-05-31 RX ADMIN — BETAMETHASONE DIPROPIONATE: 0.5 CREAM TOPICAL at 14:55

## 2022-05-31 RX ADMIN — MONTELUKAST SODIUM 10 MG: 10 TABLET, COATED ORAL at 20:23

## 2022-05-31 RX ADMIN — DICYCLOMINE HYDROCHLORIDE 20 MG: 20 TABLET ORAL at 10:31

## 2022-05-31 RX ADMIN — TRAMADOL HYDROCHLORIDE 50 MG: 50 TABLET ORAL at 03:04

## 2022-05-31 RX ADMIN — SODIUM CHLORIDE: 9 INJECTION, SOLUTION INTRAVENOUS at 05:22

## 2022-05-31 RX ADMIN — BETAMETHASONE DIPROPIONATE: 0.5 CREAM TOPICAL at 20:23

## 2022-05-31 RX ADMIN — TOPIRAMATE 50 MG: 25 TABLET, FILM COATED ORAL at 10:27

## 2022-05-31 RX ADMIN — ENOXAPARIN SODIUM 40 MG: 100 INJECTION SUBCUTANEOUS at 10:27

## 2022-05-31 RX ADMIN — SODIUM CHLORIDE: 9 INJECTION, SOLUTION INTRAVENOUS at 14:55

## 2022-05-31 RX ADMIN — TAMSULOSIN HYDROCHLORIDE 0.4 MG: 0.4 CAPSULE ORAL at 10:27

## 2022-05-31 RX ADMIN — MULTIPLE VITAMINS W/ MINERALS TAB 1 TABLET: TAB at 10:27

## 2022-05-31 RX ADMIN — TRAMADOL HYDROCHLORIDE 50 MG: 50 TABLET ORAL at 17:26

## 2022-05-31 ASSESSMENT — PAIN - FUNCTIONAL ASSESSMENT: PAIN_FUNCTIONAL_ASSESSMENT: ACTIVITIES ARE NOT PREVENTED

## 2022-05-31 ASSESSMENT — PAIN DESCRIPTION - LOCATION
LOCATION: ABDOMEN
LOCATION: FLANK

## 2022-05-31 ASSESSMENT — PAIN DESCRIPTION - PAIN TYPE: TYPE: ACUTE PAIN

## 2022-05-31 ASSESSMENT — PAIN DESCRIPTION - ONSET: ONSET: ON-GOING

## 2022-05-31 ASSESSMENT — PAIN DESCRIPTION - FREQUENCY: FREQUENCY: CONTINUOUS

## 2022-05-31 ASSESSMENT — PAIN DESCRIPTION - DESCRIPTORS: DESCRIPTORS: ACHING

## 2022-05-31 ASSESSMENT — PAIN SCALES - GENERAL
PAINLEVEL_OUTOF10: 10
PAINLEVEL_OUTOF10: 8

## 2022-05-31 ASSESSMENT — PAIN DESCRIPTION - DIRECTION: RADIATING_TOWARDS: BACK

## 2022-05-31 ASSESSMENT — PAIN DESCRIPTION - ORIENTATION: ORIENTATION: RIGHT

## 2022-05-31 NOTE — CARE COORDINATION
Review of chart for any potential discharge needs. Pt has a PCP, employer and insurance on the facesheet. Pt's o2 stats are currently 95% on Room air per vitals in epic. No needs identified for discharge intervention at this time. MD and bedside RN  if needs arise please consult case management for discharge intervention. CM not following at this time.      Deep Najera MSW, LISW-S

## 2022-05-31 NOTE — FLOWSHEET NOTE
05/31/22 1020   Vital Signs   Temp 99.3 °F (37.4 °C)   Temp Source Oral   Heart Rate (!) 107   Heart Rate Source Monitor   Resp 18   /72   BP Location Right upper arm   MAP (Calculated) 87.67   Patient Position Semi fowlers   Level of Consciousness Alert (0)   MEWS Score 2   Pain Assessment   Pain Assessment 0-10   Pain Level 8   Patient's Stated Pain Goal 4   Pain Location Abdomen   Pain Orientation Right   Pain Descriptors Aching   Functional Pain Assessment Activities are not prevented   Pain Type Acute pain   Pain Radiating Towards Back   Pain Frequency Continuous   Pain Onset On-going   Non-Pharmaceutical Pain Intervention(s) Rest;Repositioned   Response to Pain Intervention None (pain unchanged or no improvement)   Side Effects No reported side effects   Multiple Pain Sites No   Care Plan - Pain Goals   Verbalizes/displays adequate comfort level or baseline comfort level Encourage patient to monitor pain and request assistance;Assess pain using appropriate pain scale   Opioid-Induced Sedation   POSS Score 1   RASS   Walker Agitation Sedation Scale (RASS) 0   Oxygen Therapy   SpO2 95 %   Pulse Oximeter Device Mode Intermittent   Pulse Oximeter Device Location Right;Finger   O2 Device None (Room air)     AM assessment completed and vital signs completed, see flowsheet. Patient is alert & oriented. Patient reports 8/10 right abdominal pain. PRN medication given, view MAR. Patient denies further needs. Side rails up X 2. Bed in the lowest position. Call light within reach.

## 2022-05-31 NOTE — PLAN OF CARE
Problem: Discharge Planning  Goal: Discharge to home or other facility with appropriate resources  5/31/2022 1931 by Eloy Welch RN  Outcome: Progressing  5/31/2022 1106 by Damien Valdivia RN  Outcome: Progressing  Flowsheets (Taken 5/31/2022 1020)  Discharge to home or other facility with appropriate resources:   Identify barriers to discharge with patient and caregiver   Identify discharge learning needs (meds, wound care, etc)   Arrange for needed discharge resources and transportation as appropriate   Arrange for interpreters to assist at discharge as needed   Refer to discharge planning if patient needs post-hospital services based on physician order or complex needs related to functional status, cognitive ability or social support system     Problem: Pain  Goal: Verbalizes/displays adequate comfort level or baseline comfort level  5/31/2022 1931 by Eloy Welch RN  Outcome: Progressing  5/31/2022 1106 by Damien Valdivia RN  Outcome: Progressing  Flowsheets (Taken 5/31/2022 1020)  Verbalizes/displays adequate comfort level or baseline comfort level:   Encourage patient to monitor pain and request assistance   Assess pain using appropriate pain scale

## 2022-05-31 NOTE — H&P
Hospital Medicine History & Physical      PCP: Aury Birmingham    Date of Admission: 5/30/2022    Date of Service: Pt seen/examined on 5/30/22 and Admitted to Inpatient with expected LOS greater than two midnights due to medical therapy. Chief Complaint:  R flank pain      History Of Present Illness:      49 y.o. male presents with a 3 day history of increased urinary frequency, dysuria with some hematuria. He recalls recently being given flomax for urinary hesitancy 2 months ago which improved. 3 days ago he developed urinary frequency, dysuria with chills and a fever onset yesterday. Denies cough, sob, chest pain, diarrhea. He has been nauseated without emesis, with sharp right flank pain onset 3 days ago radiating to R groin. Past Medical History:          Diagnosis Date    Asthma     Bipolar disorder ([facility] [address]     Migraines     Osteoarthritis     Prostate irregularity     enlarged    Sacral radiculitis     Seizures ([facility] [address]     as a child       Past Surgical History:      History reviewed. No pertinent surgical history. Medications Prior to Admission:      Prior to Admission medications    Medication Sig Start Date End Date Taking? Authorizing Provider   traMADol (ULTRAM) 50 MG tablet Take 50 mg by mouth in the morning and at bedtime.  Shoulder pain   Yes Historical Provider, MD   tamsulosin (FLOMAX) 0.4 mg capsule Take 0.4 mg by mouth daily prostate   Yes Historical Provider, MD   loratadine (CLARITIN) 10 MG capsule Take 10 mg by mouth daily   Yes Historical Provider, MD   montelukast (SINGULAIR) 10 MG tablet Take 10 mg by mouth nightly   Yes Historical Provider, MD   Multiple Vitamins-Minerals (THERAPEUTIC MULTIVITAMIN-MINERALS) tablet Take 1 tablet by mouth daily   Yes Historical Provider, MD   Misc Natural Products (GLUCOS-CHONDROIT-MSM COMPLEX PO) Take by mouth   Yes Historical Provider, MD   dicyclomine (BENTYL) 20 MG tablet Take 20 mg by mouth every 6 hours   Yes Historical Provider, MD   topiramate (TOPAMAX) 25 MG tablet Take 50 mg by mouth daily   Yes Historical Provider, MD   propranolol (INDERAL) 60 MG tablet Take 60 mg by mouth once Migraines   Yes Historical Provider, MD   meclizine (ANTIVERT) 25 MG tablet Take 25 mg by mouth 3 times daily as needed   Yes Historical Provider, MD   lamoTRIgine (LAMICTAL) 100 MG tablet Take 100 mg by mouth daily For bipolar disorder   Yes Historical Provider, MD       Allergies:  Aspirin, Comptche-d [diphenhydramine], Mucinex [guaifenesin er], Naproxen, Voltaren [diclofenac], and Zyrtec [cetirizine]    Social History:           TOBACCO:   reports that he has never smoked. He has never used smokeless tobacco.  ETOH:   reports no history of alcohol use. Family History:           History reviewed. No pertinent family history. REVIEW OF SYSTEMS:   Pertinent positives as noted in the HPI. All other systems reviewed and negative. PHYSICAL EXAM PERFORMED:    /72   Pulse 96   Temp 98.5 °F (36.9 °C) (Oral)   Resp 16   Ht 5' 6\" (1.676 m)   Wt 218 lb 3.2 oz (99 kg)   SpO2 97%   BMI 35.22 kg/m²     General appearance:  No apparent distress, appears stated age and cooperative. HEENT:  Normal cephalic, atraumatic without obvious deformity. Pupils equal, round, and reactive to light. Extra ocular muscles intact. Conjunctivae/corneas clear. Neck: Supple, with full range of motion. No jugular venous distention. Trachea midline. Respiratory:  Normal respiratory effort. Clear to auscultation, bilaterally without Rales/Wheezes/Rhonchi. Cardiovascular:  Regular rate and rhythm with normal S1/S2 without murmurs, rubs or gallops. Abdomen: Soft, non-tender, non-distended with normal bowel sounds. Musculoskeletal:  No clubbing, cyanosis or edema bilaterally. Full range of motion without deformity. Skin: Skin color, texture, turgor normal.  No rashes or lesions. Neurologic:  Neurovascularly intact without any focal sensory/motor deficits.  Cranial nerves: II-XII intact, grossly non-focal.  Psychiatric:  Alert and oriented, thought content appropriate, normal insight  Capillary Refill: Brisk,< 3 seconds   Peripheral Pulses: +2 palpable, equal bilaterally       Labs:     Recent Labs     05/30/22  1452   WBC 15.4*   HGB 13.9   HCT 40.8        Recent Labs     05/30/22  1452      K 3.8      CO2 20*   BUN 10   CREATININE 0.9   CALCIUM 8.9     Recent Labs     05/30/22  1452   AST 14*   ALT 14   BILITOT 1.2*   ALKPHOS 80     No results for input(s): INR in the last 72 hours. No results for input(s): Sonali Basco in the last 72 hours. Urinalysis:      Lab Results   Component Value Date    NITRU Negative 05/30/2022    WBCUA  05/30/2022    BACTERIA 3+ 05/30/2022    RBCUA 5-10 05/30/2022    BLOODU SMALL 05/30/2022    SPECGRAV <=1.005 05/30/2022    GLUCOSEU Negative 05/30/2022       Radiology:          CT ABDOMEN PELVIS W IV CONTRAST Additional Contrast? None   Final Result   Mild bladder wall thickening with surrounding fat stranding, suggesting   underlying cystitis. No stones or hydronephrosis on the right or left      Diverticulosis. No diverticulitis. Appendix is normal in caliber         XR CHEST PORTABLE   Final Result   No significant findings in the chest.             ASSESSMENT:    Active Hospital Problems    Diagnosis Date Noted    Pyelonephritis [N12] 05/30/2022     Priority: Medium         PLAN:    UTI / pyelo  - IV abx    H/o hesitancy/ bph  - Urology consult, suspect passed stone, continue flomax    DVT Prophylaxis: lovenox  Diet: ADULT DIET; Regular  Code Status: Full Carmelita Liriano MD    Thank you Henrique Britton for the opportunity to be involved in this patient's care. If you have any questions or concerns please feel free to contact me at [phone].

## 2022-05-31 NOTE — PROGRESS NOTES
Shift assessment complete. See doc flow. Nightly medications given see MAR. Patient A/Ox4. Patient skin intact w/scattered bruising. Patient on telemetry. Patient on IVF at this time. Patient with no complaints at this time. Bed in lowest position and locked. Call light and bedside table within easy reach.

## 2022-05-31 NOTE — CONSULTS
Patient:  Jose Luis Dawkins  YOB: 1970   CSN:  [id number]    [facility]       Urology Attending Consult Note     Reason for Consultation: pyelonephritis, UTI, bph    HPI:  Deborah Henning is a 49 y.o. male who presented with a few day history of dysuria and then darking urine and pt came to ER. In ED a CT was done and showed no hydro but enlarged prostate. Pt states he had cystoscopy not long ago but does not remember provider. I could not find notes of this in our urology group office charts. He feels better since admit with abx, IVF. The UA with concern for infection. He is already on flomax as outpt and he felt that did help voiding issues. PSA  <1.0  On multiple blood draw this year    Past Medical History:   Diagnosis Date    Asthma     Bipolar disorder ([facility] [address]     Migraines     Osteoarthritis     Prostate irregularity     enlarged    Sacral radiculitis     Seizures ([facility] [address]     as a child       History reviewed. No pertinent surgical history.     Medication List reviewed:     Current Facility-Administered Medications   Medication Dose Route Frequency Provider Last Rate Last Admin    finasteride (PROSCAR) tablet 5 mg  5 mg Oral Daily May Gonzalez MD   5 mg at 05/31/22 1027    dicyclomine (BENTYL) tablet 20 mg  20 mg Oral 4x Daily PRN Angélica Tran MD   20 mg at 05/31/22 1031    montelukast (SINGULAIR) tablet 10 mg  10 mg Oral Nightly Angélica Tran MD   10 mg at 05/30/22 2011    therapeutic multivitamin-minerals 1 tablet  1 tablet Oral Daily Angélica Tran MD   1 tablet at 05/31/22 1027    tamsulosin (FLOMAX) capsule 0.4 mg  0.4 mg Oral Daily Angélica Tran MD   0.4 mg at 05/31/22 1027    topiramate (TOPAMAX) tablet 50 mg  50 mg Oral Daily Angélica Tran MD   50 mg at 05/31/22 1027    traMADol (ULTRAM) tablet 50 mg  50 mg Oral Q6H PRN Angélica Tran MD   50 mg at 05/31/22 1031    levoFLOXacin (LEVAQUIN) 750 MG/150ML infusion 750 mg  750 mg IntraVENous Q24H Myke Riley MD   Stopped at 05/30/22 2153    sodium chloride flush 0.9 % injection 5-40 mL  5-40 mL IntraVENous 2 times per day Myke Riley MD        sodium chloride flush 0.9 % injection 5-40 mL  5-40 mL IntraVENous PRN Myke Riley MD   10 mL at 05/30/22 1844    0.9 % sodium chloride infusion   IntraVENous PRN Myke Riley MD        enoxaparin (LOVENOX) injection 40 mg  40 mg SubCUTAneous Daily Myke Riley MD   40 mg at 05/31/22 1027    ondansetron (ZOFRAN-ODT) disintegrating tablet 4 mg  4 mg Oral Q8H PRN Myke Riley MD        Or    ondansetron (ZOFRAN) injection 4 mg  4 mg IntraVENous Q6H PRN Myke Riley MD        polyethylene glycol (GLYCOLAX) packet 17 g  17 g Oral Daily PRN Myke Riley MD        acetaminophen (TYLENOL) tablet 650 mg  650 mg Oral Q6H PRN Myke Riley MD        Or    acetaminophen (TYLENOL) suppository 650 mg  650 mg Rectal Q6H PRN Myke Riley MD        0.9 % sodium chloride infusion   IntraVENous Continuous Myke Riley  mL/hr at 05/31/22 0522 New Bag at 05/31/22 0522       Allergies   Allergen Reactions    Aspirin      Reyes syndrome    Clinton-D [Diphenhydramine]     Mucinex [Guaifenesin Er] Swelling    Naproxen      Kidney problems    Voltaren [Diclofenac]      Kidney problems    Zyrtec [Cetirizine] Swelling       History reviewed. No pertinent family history. Social History     Tobacco Use    Smoking status: Never Smoker    Smokeless tobacco: Never Used   Substance Use Topics    Alcohol use: Never    Drug use: Never         Review of Systems: A 12 point ROS was performed and was unremarkable unless listed in the history of present illness. I/O last 3 completed shifts:   In: 61 [P.O.:50; I.V.:10]  Out: -     Physical Exam:  Patient Vitals for the past 8 hrs:   BP Temp Temp src Pulse Resp SpO2   05/31/22 1101 -- -- -- -- 18 --   05/31/22 1020 119/72 99.3 °F (37.4 °C) Oral (!) 107 18 95 %     Constitutional: pleasant male in NAD, with a nl body habitus   Eyes: pink conjunctivae, no ptosis  ENT: lips without cyanosis, normal appearance of ears and nose externally  Neck: no masses or lesions, trachea midline  Respiratory: normal respiratory movements without distress, no audible wheezes   Cardiovascular: regular rate and rhythm, lower extremities without edema   Abdomen: soft, NTND, no masses, no rebound or guarding  Back: mild CVAT, no abnormal curvature of the spine  Skin: warm and dry, no rashes, lesions, or ulcers  Musculoskeletal: normal ROM, m.tone, no digital cyanosis, head normocephalic  Psych: normal mood and affect, alert and appropriately answers questions  : bladder not palpable, normal, no doshi catheter; meatus tighter;  Testis/penis otherwise nl. Labs:    Lab Results   Component Value Date    CREATININE 0.8 (L) 05/31/2022    CREATININE 0.9 05/30/2022     Lab Results   Component Value Date    COLORU Yellow 05/30/2022    NITRU Negative 05/30/2022    GLUCOSEU Negative 05/30/2022    KETUA TRACE 05/30/2022    UROBILINOGEN 2.0 05/30/2022    BILIRUBINUR Negative 05/30/2022     Lab Results   Component Value Date    WBC 10.1 05/31/2022    HGB 12.5 (L) 05/31/2022    HCT 37.7 (L) 05/31/2022    MCV 87.2 05/31/2022     05/31/2022     Radiology: \"Imaging was independently reviewed by myself and I agree with the radiology interpretation except as noted above\"  Ct abd pelvis  With contrast:  No hydronephrosis or obvious stones. Enlarged prostate but no clear median bar. Prostate 70-90cc -- larger for age    Impression:  Probable Pyelonephritis secondary to UTI present prior to admit  BPH  Mild Meatal narrowing - cortisone cream ordered for meatus. Plan:  -cont abx, supportive care  -added finasteride to flomax; Okay to dc home with both medications.   -cont to monitor urine output    cystoscopy done in last year per patient.     Can fu with her original urologist once he is discharged. Otherwise, can follow up with me in 2-3 weeks. Consider outpatient urodynamics and possible turp like surgery.          Aniya Clarke MD  Office: [phone]

## 2022-05-31 NOTE — PLAN OF CARE
Problem: Discharge Planning  Goal: Discharge to home or other facility with appropriate resources  Outcome: Progressing  Flowsheets (Taken 5/31/2022 1020)  Discharge to home or other facility with appropriate resources:   Identify barriers to discharge with patient and caregiver   Identify discharge learning needs (meds, wound care, etc)   Arrange for needed discharge resources and transportation as appropriate   Arrange for interpreters to assist at discharge as needed   Refer to discharge planning if patient needs post-hospital services based on physician order or complex needs related to functional status, cognitive ability or social support system     Problem: Pain  Goal: Verbalizes/displays adequate comfort level or baseline comfort level  Outcome: Progressing  Flowsheets (Taken 5/31/2022 1020)  Verbalizes/displays adequate comfort level or baseline comfort level:   Encourage patient to monitor pain and request assistance   Assess pain using appropriate pain scale

## 2022-06-01 PROCEDURE — 6370000000 HC RX 637 (ALT 250 FOR IP): Performed by: INTERNAL MEDICINE

## 2022-06-01 PROCEDURE — 6370000000 HC RX 637 (ALT 250 FOR IP): Performed by: UROLOGY

## 2022-06-01 PROCEDURE — 6360000002 HC RX W HCPCS: Performed by: INTERNAL MEDICINE

## 2022-06-01 PROCEDURE — 1200000000 HC SEMI PRIVATE

## 2022-06-01 PROCEDURE — 99232 SBSQ HOSP IP/OBS MODERATE 35: CPT | Performed by: INTERNAL MEDICINE

## 2022-06-01 PROCEDURE — 2580000003 HC RX 258: Performed by: INTERNAL MEDICINE

## 2022-06-01 RX ADMIN — TRAMADOL HYDROCHLORIDE 50 MG: 50 TABLET ORAL at 18:57

## 2022-06-01 RX ADMIN — BETAMETHASONE DIPROPIONATE: 0.5 CREAM TOPICAL at 08:57

## 2022-06-01 RX ADMIN — FINASTERIDE 5 MG: 5 TABLET, FILM COATED ORAL at 08:57

## 2022-06-01 RX ADMIN — SODIUM CHLORIDE, PRESERVATIVE FREE 10 ML: 5 INJECTION INTRAVENOUS at 22:30

## 2022-06-01 RX ADMIN — LEVOFLOXACIN 750 MG: 5 INJECTION, SOLUTION INTRAVENOUS at 18:54

## 2022-06-01 RX ADMIN — SODIUM CHLORIDE, PRESERVATIVE FREE 10 ML: 5 INJECTION INTRAVENOUS at 09:00

## 2022-06-01 RX ADMIN — BETAMETHASONE DIPROPIONATE: 0.5 CREAM TOPICAL at 22:29

## 2022-06-01 RX ADMIN — TAMSULOSIN HYDROCHLORIDE 0.4 MG: 0.4 CAPSULE ORAL at 08:57

## 2022-06-01 RX ADMIN — MONTELUKAST SODIUM 10 MG: 10 TABLET, COATED ORAL at 22:29

## 2022-06-01 RX ADMIN — ENOXAPARIN SODIUM 40 MG: 100 INJECTION SUBCUTANEOUS at 08:57

## 2022-06-01 RX ADMIN — TOPIRAMATE 50 MG: 25 TABLET, FILM COATED ORAL at 08:57

## 2022-06-01 RX ADMIN — DICYCLOMINE HYDROCHLORIDE 20 MG: 20 TABLET ORAL at 08:59

## 2022-06-01 RX ADMIN — MULTIPLE VITAMINS W/ MINERALS TAB 1 TABLET: TAB at 08:58

## 2022-06-01 ASSESSMENT — PAIN DESCRIPTION - PAIN TYPE
TYPE: ACUTE PAIN
TYPE: ACUTE PAIN

## 2022-06-01 ASSESSMENT — PAIN SCALES - GENERAL
PAINLEVEL_OUTOF10: 4
PAINLEVEL_OUTOF10: 7
PAINLEVEL_OUTOF10: 8

## 2022-06-01 ASSESSMENT — PAIN DESCRIPTION - ORIENTATION
ORIENTATION: RIGHT
ORIENTATION: LEFT
ORIENTATION: RIGHT

## 2022-06-01 ASSESSMENT — PAIN DESCRIPTION - FREQUENCY: FREQUENCY: CONTINUOUS

## 2022-06-01 ASSESSMENT — PAIN DESCRIPTION - DESCRIPTORS
DESCRIPTORS: ACHING
DESCRIPTORS: ACHING;DISCOMFORT
DESCRIPTORS: ACHING

## 2022-06-01 ASSESSMENT — PAIN - FUNCTIONAL ASSESSMENT
PAIN_FUNCTIONAL_ASSESSMENT: ACTIVITIES ARE NOT PREVENTED
PAIN_FUNCTIONAL_ASSESSMENT: ACTIVITIES ARE NOT PREVENTED

## 2022-06-01 ASSESSMENT — PAIN DESCRIPTION - ONSET: ONSET: ON-GOING

## 2022-06-01 ASSESSMENT — PAIN DESCRIPTION - LOCATION
LOCATION: FLANK
LOCATION: SHOULDER
LOCATION: FLANK

## 2022-06-01 NOTE — PROGRESS NOTES
Urology Progress Note    Subjective: I feel a little better. HPI: Patient has been admitted for sepsis. P/E  /75   Pulse (!) 112   Temp 98.9 °F (37.2 °C) (Oral)   Resp 18   Ht 5' 6\" (1.676 m)   Wt 218 lb 3.2 oz (99 kg)   SpO2 96%   BMI 35.22 kg/m²   Skin: Intact  Respiratory: Breathing without difficulty, stable. GI: No acute changes, stable po intake. : Mild CVA tenderness, voiding. MSK: No acute changes, stable. Neuro: No acute changes, stable. Labs  Lab Results   Component Value Date    WBC 10.1 05/31/2022    HGB 12.5 05/31/2022    HCT 37.7 05/31/2022    MCV 87.2 05/31/2022     05/31/2022     Lab Results   Component Value Date     05/31/2022    K 4.0 05/31/2022    K 3.8 05/30/2022     05/31/2022    CO2 21 05/31/2022    BUN 7 05/31/2022    CREATININE 0.8 05/31/2022    CALCIUM 8.4 05/31/2022       Assessment/Plan  UCx positive for Klebsiella. He is feeling better with medical management. In the near future he will need to be set up for a cysto. Continue dual medical therapy for now.     Electronically signed by Kwadwo Lopez MD on 6/1/2022 at 4:10 PM

## 2022-06-01 NOTE — PLAN OF CARE
Problem: Discharge Planning  Goal: Discharge to home or other facility with appropriate resources  Outcome: Progressing  Flowsheets (Taken 5/31/2022 2026 by Blaine Aguilar RN)  Discharge to home or other facility with appropriate resources: Identify barriers to discharge with patient and caregiver     Problem: Pain  Goal: Verbalizes/displays adequate comfort level or baseline comfort level  Outcome: Progressing

## 2022-06-01 NOTE — PROGRESS NOTES
PM Shift report given to Simin Fish at Bedside. Patient is stable. All end of shift needs have been met. No further assistance needed at this time.

## 2022-06-01 NOTE — PROGRESS NOTES
Shift assessment complete. See doc flow. Nightly medications given see MAR. Patient A/Ox4. Patient skin intact w/scattered bruising. Patient on telemetry. Patient temp 102.4, tylenol not available at this time, MD notified. Patient with no complaints at this time. Bed in lowest position and locked. Call light and bedside table within easy reach.

## 2022-06-01 NOTE — PROGRESS NOTES
Hospitalist Progress Note      PCP: Jean Fabian    Date of Admission: 5/30/2022    Chief Complaint: R flank pain, dysuria. Subjective:     ongoing Right side flank pain radiating into the R buttocks      Febrile to 102.4 last night. Medications:  Reviewed    Infusion Medications    sodium chloride       Scheduled Medications    finasteride  5 mg Oral Daily    betamethasone dipropionate   Topical BID    montelukast  10 mg Oral Nightly    therapeutic multivitamin-minerals  1 tablet Oral Daily    tamsulosin  0.4 mg Oral Daily    topiramate  50 mg Oral Daily    levofloxacin  750 mg IntraVENous Q24H    sodium chloride flush  5-40 mL IntraVENous 2 times per day    enoxaparin  40 mg SubCUTAneous Daily     PRN Meds: dicyclomine, traMADol, sodium chloride flush, sodium chloride, ondansetron **OR** ondansetron, polyethylene glycol, acetaminophen **OR** acetaminophen      Intake/Output Summary (Last 24 hours) at 6/1/2022 1206  Last data filed at 6/1/2022 1111  Gross per 24 hour   Intake 200 ml   Output 1550 ml   Net -1350 ml       Physical Exam Performed:    /75   Pulse (!) 112   Temp 98.9 °F (37.2 °C) (Oral)   Resp 18   Ht 5' 6\" (1.676 m)   Wt 218 lb 3.2 oz (99 kg)   SpO2 96%   BMI 35.22 kg/m²     General appearance: No apparent distress, appears stated age and cooperative. HEENT: Pupils equal, round, and reactive to light. Conjunctivae/corneas clear. Neck: Supple, with full range of motion. No jugular venous distention. Trachea midline. Respiratory:  Normal respiratory effort. Clear to auscultation, bilaterally without Rales/Wheezes/Rhonchi. Cardiovascular: Regular rate and rhythm with normal S1/S2 without murmurs, rubs or gallops. Abdomen: Soft, non-tender, non-distended with normal bowel sounds. Musculoskeletal: No clubbing, cyanosis or edema bilaterally. Full range of motion without deformity. Skin: Skin color, texture, turgor normal.  No rashes or lesions.   Neurologic: Neurovascularly intact without any focal sensory/motor deficits. Cranial nerves: II-XII intact, grossly non-focal.  Psychiatric: Alert and oriented, thought content appropriate, normal insight  Capillary Refill: Brisk,3 seconds, normal   Peripheral Pulses: +2 palpable, equal bilaterally       Labs:   Recent Labs     05/30/22  1452 05/31/22  0551   WBC 15.4* 10.1   HGB 13.9 12.5*   HCT 40.8 37.7*    141     Recent Labs     05/30/22  1452 05/31/22  0551    141   K 3.8 4.0    109   CO2 20* 21   BUN 10 7   CREATININE 0.9 0.8*   CALCIUM 8.9 8.4   PHOS  --  1.8*     Recent Labs     05/30/22 1452   AST 14*   ALT 14   BILITOT 1.2*   ALKPHOS 80     No results for input(s): INR in the last 72 hours. No results for input(s): Shelbi Beat in the last 72 hours. Urinalysis:      Lab Results   Component Value Date    NITRU Negative 05/30/2022    WBCUA  05/30/2022    BACTERIA 3+ 05/30/2022    RBCUA 5-10 05/30/2022    BLOODU SMALL 05/30/2022    SPECGRAV <=1.005 05/30/2022    GLUCOSEU Negative 05/30/2022       Radiology:  CT ABDOMEN PELVIS W IV CONTRAST Additional Contrast? None   Final Result   Mild bladder wall thickening with surrounding fat stranding, suggesting   underlying cystitis. No stones or hydronephrosis on the right or left      Diverticulosis. No diverticulitis. Appendix is normal in caliber         XR CHEST PORTABLE   Final Result   No significant findings in the chest.                 Assessment/Plan:    Active Hospital Problems    Diagnosis     Acute cystitis without hematuria [N30.00]      Priority: Medium    Pyelonephritis [N12]      Priority: Medium       Acute R Pyelonephritis. On IV levaquin. Follow Cx  Urology consult. Hx of BPH - follows with urology. DVT Prophylaxis: lovenox  Diet: ADULT DIET; Regular  Code Status: Full Code      Dispo - will need to be afebrile prior to discharge.        Luis A Martni MD

## 2022-06-01 NOTE — PROGRESS NOTES
Patient alert and oriented; wife at bedside. Questions answered. Patient concerned about diet change; will reach out to MD. Needs are met at this time. In chair, call light within reach.

## 2022-06-02 VITALS
RESPIRATION RATE: 14 BRPM | BODY MASS INDEX: 35.07 KG/M2 | OXYGEN SATURATION: 97 % | TEMPERATURE: 97.3 F | DIASTOLIC BLOOD PRESSURE: 76 MMHG | HEIGHT: 66 IN | HEART RATE: 92 BPM | WEIGHT: 218.2 LBS | SYSTOLIC BLOOD PRESSURE: 124 MMHG

## 2022-06-02 LAB
ALBUMIN SERPL-MCNC: 3.4 G/DL (ref 3.4–5)
ANION GAP SERPL CALCULATED.3IONS-SCNC: 12 MMOL/L (ref 3–16)
BASOPHILS ABSOLUTE: 0 K/UL (ref 0–0.2)
BASOPHILS RELATIVE PERCENT: 0.5 %
BUN BLDV-MCNC: 9 MG/DL (ref 7–20)
CALCIUM SERPL-MCNC: 8.8 MG/DL (ref 8.3–10.6)
CHLORIDE BLD-SCNC: 109 MMOL/L (ref 99–110)
CO2: 18 MMOL/L (ref 21–32)
CREAT SERPL-MCNC: 0.7 MG/DL (ref 0.9–1.3)
EOSINOPHILS ABSOLUTE: 0.4 K/UL (ref 0–0.6)
EOSINOPHILS RELATIVE PERCENT: 7 %
GFR AFRICAN AMERICAN: >60
GFR NON-AFRICAN AMERICAN: >60
GLUCOSE BLD-MCNC: 104 MG/DL (ref 70–99)
HCT VFR BLD CALC: 36.6 % (ref 40.5–52.5)
HEMOGLOBIN: 12.5 G/DL (ref 13.5–17.5)
LYMPHOCYTES ABSOLUTE: 1 K/UL (ref 1–5.1)
LYMPHOCYTES RELATIVE PERCENT: 19.3 %
MCH RBC QN AUTO: 29.4 PG (ref 26–34)
MCHC RBC AUTO-ENTMCNC: 34 G/DL (ref 31–36)
MCV RBC AUTO: 86.4 FL (ref 80–100)
MONOCYTES ABSOLUTE: 0.7 K/UL (ref 0–1.3)
MONOCYTES RELATIVE PERCENT: 13.5 %
NEUTROPHILS ABSOLUTE: 3.2 K/UL (ref 1.7–7.7)
NEUTROPHILS RELATIVE PERCENT: 59.7 %
ORGANISM: ABNORMAL
PDW BLD-RTO: 13.1 % (ref 12.4–15.4)
PHOSPHORUS: 2.8 MG/DL (ref 2.5–4.9)
PLATELET # BLD: 182 K/UL (ref 135–450)
PMV BLD AUTO: 8 FL (ref 5–10.5)
POTASSIUM SERPL-SCNC: 3.8 MMOL/L (ref 3.5–5.1)
RBC # BLD: 4.24 M/UL (ref 4.2–5.9)
SODIUM BLD-SCNC: 139 MMOL/L (ref 136–145)
URINE CULTURE, ROUTINE: ABNORMAL
WBC # BLD: 5.4 K/UL (ref 4–11)

## 2022-06-02 PROCEDURE — 99239 HOSP IP/OBS DSCHRG MGMT >30: CPT | Performed by: INTERNAL MEDICINE

## 2022-06-02 PROCEDURE — 6370000000 HC RX 637 (ALT 250 FOR IP): Performed by: UROLOGY

## 2022-06-02 PROCEDURE — 6370000000 HC RX 637 (ALT 250 FOR IP): Performed by: INTERNAL MEDICINE

## 2022-06-02 PROCEDURE — 2580000003 HC RX 258: Performed by: INTERNAL MEDICINE

## 2022-06-02 PROCEDURE — 36415 COLL VENOUS BLD VENIPUNCTURE: CPT

## 2022-06-02 PROCEDURE — 6360000002 HC RX W HCPCS: Performed by: INTERNAL MEDICINE

## 2022-06-02 PROCEDURE — 85025 COMPLETE CBC W/AUTO DIFF WBC: CPT

## 2022-06-02 PROCEDURE — 80069 RENAL FUNCTION PANEL: CPT

## 2022-06-02 RX ORDER — LEVOFLOXACIN 750 MG/1
750 TABLET ORAL DAILY
Status: DISCONTINUED | OUTPATIENT
Start: 2022-06-02 | End: 2022-06-02 | Stop reason: HOSPADM

## 2022-06-02 RX ORDER — LEVOFLOXACIN 500 MG/1
500 TABLET, FILM COATED ORAL DAILY
Qty: 10 TABLET | Refills: 0 | Status: SHIPPED | OUTPATIENT
Start: 2022-06-02 | End: 2022-06-12

## 2022-06-02 RX ORDER — FINASTERIDE 5 MG/1
5 TABLET, FILM COATED ORAL DAILY
Qty: 30 TABLET | Refills: 3 | Status: SHIPPED | OUTPATIENT
Start: 2022-06-03

## 2022-06-02 RX ADMIN — ENOXAPARIN SODIUM 40 MG: 100 INJECTION SUBCUTANEOUS at 08:12

## 2022-06-02 RX ADMIN — SODIUM CHLORIDE, PRESERVATIVE FREE 10 ML: 5 INJECTION INTRAVENOUS at 08:13

## 2022-06-02 RX ADMIN — FINASTERIDE 5 MG: 5 TABLET, FILM COATED ORAL at 08:12

## 2022-06-02 RX ADMIN — TAMSULOSIN HYDROCHLORIDE 0.4 MG: 0.4 CAPSULE ORAL at 08:12

## 2022-06-02 RX ADMIN — MULTIPLE VITAMINS W/ MINERALS TAB 1 TABLET: TAB at 08:12

## 2022-06-02 RX ADMIN — BETAMETHASONE DIPROPIONATE: 0.5 CREAM TOPICAL at 08:13

## 2022-06-02 RX ADMIN — TOPIRAMATE 50 MG: 25 TABLET, FILM COATED ORAL at 08:12

## 2022-06-02 RX ADMIN — DICYCLOMINE HYDROCHLORIDE 20 MG: 20 TABLET ORAL at 08:20

## 2022-06-02 ASSESSMENT — PAIN SCALES - GENERAL: PAINLEVEL_OUTOF10: 1

## 2022-06-02 ASSESSMENT — PAIN DESCRIPTION - LOCATION: LOCATION: ABDOMEN

## 2022-06-02 NOTE — DISCHARGE SUMMARY
Hospital Medicine Discharge Summary    Patient ID: Bernie Vick      Patient's PCP: Annette Menjivar    Admit Date: 5/30/2022     Discharge Date:   6/2/2022    Admitting Provider: Elena Rodriguez MD     Discharge Provider: Elena Rodriguez MD     Discharge Diagnoses: Active Hospital Problems    Diagnosis     Acute cystitis without hematuria [N30.00]      Priority: Medium    Pyelonephritis [N12]      Priority: Medium       The patient was seen and examined on day of discharge and this discharge summary is in conjunction with any daily progress note from day of discharge. Hospital Course: 49 y.o. male presents with a 3 day history of increased urinary frequency, dysuria with some hematuria. He recalls recently being given flomax for urinary hesitancy 2 months ago which improved. 3 days ago he developed urinary frequency, dysuria with chills and a fever       Acute R Pyelonephritis. On IV levaquin - to PO. Follow Cx - klebsiella - final sensitivity pending. Urology consult.         Hx of BPH - follows with urology. Cont flomax. Started finasteride this admission. Definitive procedure being discussed with pt - will have OP follow up.                 Physical Exam Performed:     /76   Pulse 92   Temp 97.3 °F (36.3 °C) (Oral)   Resp 14   Ht 5' 6\" (1.676 m)   Wt 218 lb 3.2 oz (99 kg)   SpO2 97%   BMI 35.22 kg/m²       General appearance:  No apparent distress, appears stated age and cooperative. HEENT:  Normal cephalic, atraumatic without obvious deformity. Pupils equal, round, and reactive to light. Extra ocular muscles intact. Conjunctivae/corneas clear. Neck: Supple, with full range of motion. No jugular venous distention. Trachea midline. Respiratory:  Normal respiratory effort. Clear to auscultation, bilaterally without Rales/Wheezes/Rhonchi. Cardiovascular:  Regular rate and rhythm with normal S1/S2 without murmurs, rubs or gallops.   Abdomen: Soft, non-tender, non-distended with normal bowel sounds. Musculoskeletal:  No clubbing, cyanosis or edema bilaterally. Full range of motion without deformity. Skin: Skin color, texture, turgor normal.  No rashes or lesions. Neurologic:  Neurovascularly intact without any focal sensory/motor deficits. Cranial nerves: II-XII intact, grossly non-focal.  Psychiatric:  Alert and oriented, thought content appropriate, normal insight  Capillary Refill: Brisk,< 3 seconds   Peripheral Pulses: +2 palpable, equal bilaterally       Labs: For convenience and continuity at follow-up the following most recent labs are provided:      CBC:    Lab Results   Component Value Date    WBC 5.4 06/02/2022    HGB 12.5 06/02/2022    HCT 36.6 06/02/2022     06/02/2022       Renal:    Lab Results   Component Value Date     06/02/2022    K 3.8 06/02/2022    K 3.8 05/30/2022     06/02/2022    CO2 18 06/02/2022    BUN 9 06/02/2022    CREATININE 0.7 06/02/2022    CALCIUM 8.8 06/02/2022    PHOS 2.8 06/02/2022         Significant Diagnostic Studies    Radiology:   CT ABDOMEN PELVIS W IV CONTRAST Additional Contrast? None   Final Result   Mild bladder wall thickening with surrounding fat stranding, suggesting   underlying cystitis. No stones or hydronephrosis on the right or left      Diverticulosis. No diverticulitis. Appendix is normal in caliber         XR CHEST PORTABLE   Final Result   No significant findings in the chest.                Consults:     IP CONSULT TO HOSPITALIST  IP CONSULT TO UROLOGY    Disposition: home    Condition at Discharge: Stable    Discharge Instructions/Follow-up:  Urology 2 weeks.      Code Status:  Full Code     Activity: activity as tolerated    Diet: regular diet      Discharge Medications:     Current Discharge Medication List           Details   finasteride (PROSCAR) 5 MG tablet Take 1 tablet by mouth daily  Qty: 30 tablet, Refills: 3      levoFLOXacin (LEVAQUIN) 500 MG tablet Take 1 tablet by mouth daily for 10 days  Qty: 10 tablet, Refills: 0              Details   traMADol (ULTRAM) 50 MG tablet Take 50 mg by mouth in the morning and at bedtime. Shoulder pain      tamsulosin (FLOMAX) 0.4 mg capsule Take 0.4 mg by mouth daily prostate      loratadine (CLARITIN) 10 MG capsule Take 10 mg by mouth daily      montelukast (SINGULAIR) 10 MG tablet Take 10 mg by mouth nightly      Multiple Vitamins-Minerals (THERAPEUTIC MULTIVITAMIN-MINERALS) tablet Take 1 tablet by mouth daily      Misc Natural Products (GLUCOS-CHONDROIT-MSM COMPLEX PO) Take by mouth      dicyclomine (BENTYL) 20 MG tablet Take 20 mg by mouth every 6 hours      topiramate (TOPAMAX) 25 MG tablet Take 50 mg by mouth daily      propranolol (INDERAL) 60 MG tablet Take 60 mg by mouth once Migraines      meclizine (ANTIVERT) 25 MG tablet Take 25 mg by mouth 3 times daily as needed      lamoTRIgine (LAMICTAL) 100 MG tablet Take 100 mg by mouth daily For bipolar disorder             Time Spent on discharge is more than 30 minutes in the examination, evaluation, counseling and review of medications and discharge plan. Signed:    Yovanny Atkinson MD   6/2/2022      Thank you Ezio Jimenes for the opportunity to be involved in this patient's care. If you have any questions or concerns, please feel free to contact me at [phone].

## 2022-06-02 NOTE — PROGRESS NOTES
Shift assessment complete. Alert and oriented. Patient denies any needs at this time. Patient denies any pain at this time. Bed in lowest position. Side rails up x2. Call light in reach.

## 2022-06-02 NOTE — PROGRESS NOTES
Pt given work return note for 6/6/22 per Dr Priscilla Raphael. Pt given written and verbal discharge instructions with prescriptions  information. . Pt indicated understanding and received prescription and home medication instructions. Pt packed own belongings. Pt taken down to family car via wheelchair.

## 2022-06-02 NOTE — PLAN OF CARE
Problem: Discharge Planning  Goal: Discharge to home or other facility with appropriate resources  Outcome: Progressing  Flowsheets (Taken 6/1/2022 2215)  Discharge to home or other facility with appropriate resources: Identify barriers to discharge with patient and caregiver     Problem: Pain  Goal: Verbalizes/displays adequate comfort level or baseline comfort level  Outcome: Progressing

## 2022-06-02 NOTE — PLAN OF CARE
Problem: Discharge Planning  Goal: Discharge to home or other facility with appropriate resources  6/2/2022 0900 by Zuly Beard RN  Outcome: Adequate for Discharge  6/2/2022 0044 by David Pratt RN  Outcome: Progressing  Flowsheets (Taken 6/1/2022 2215)  Discharge to home or other facility with appropriate resources: Identify barriers to discharge with patient and caregiver     Problem: Pain  Goal: Verbalizes/displays adequate comfort level or baseline comfort level  6/2/2022 0900 by Zuly Beard RN  Outcome: Adequate for Discharge  6/2/2022 0044 by David Pratt RN  Outcome: Progressing

## 2022-06-03 LAB
BLOOD CULTURE, ROUTINE: NORMAL
CULTURE, BLOOD 2: NORMAL

## 2022-06-14 NOTE — PROGRESS NOTES
Physician Progress Note      PATIENT:               Isabelle Badillo  CSN #:                  [id number]  :                       1970  ADMIT DATE:       2022 2:37 PM  [facility] [place] DATE:        2022 1:40 PM  RESPONDING  PROVIDER #:        Denise Panchal MD          QUERY TEXT:    Pt admitted with pyelonephritis and UTI. Noted documentation of sepsis by ED   Provider and Urology by  consultant. If possible, please document in progress   notes and discharge summary:      The medical record reflects the following:  Risk Factors: UTI, pyelonepritis  Clinical Indicators: UCx positive for Klebsiella, WBC 15.4, lactic 1.7, vitals   on  -: T 102.4, HR , SBP , RR 14-18, SPO2 96-99%  Treatment: Urology consult, IVF, CT, serial labs, UA, Levaquin, supportive   care    Thank you,  Diana Lozano RN, CDS  [email or web address]  Options provided:  -- Sepsis confirmed present on admission  -- Sepsis ruled out  -- Other - I will add my own diagnosis  -- Disagree - Not applicable / Not valid  -- Disagree - Clinically unable to determine / Unknown  -- Refer to Clinical Documentation Reviewer    PROVIDER RESPONSE TEXT:    The diagnosis of sepsis was confirmed as present on admission.     Query created by: Fidel Estrella on 2022 2:24 PM      Electronically signed by:  Denise Panchal MD 2022 1:57 PM

## 2023-01-01 NOTE — PROGRESS NOTES
"[facility] - NICU  Neonatology  Progress Note    Patient Name: Tin Lyn  MRN: [id number]  Admission Date: 2023  Hospital Length of Stay: 5 days  Attending Physician: Abelino Warren DO    At Birth Gestational Age: 35w3d  Corrected Gestational Age 36w 1d  Chronological Age: 5 days    Subjective:     Interval History: stable in crib    Scheduled Meds:   pediatric multivitamin with iron  1 mL Oral Daily     Continuous Infusions:  PRN Meds:    Nutritional Support: Enteral: Enfamil 22 KCal    Objective:     Vital Signs (Most Recent):  Temp: 98.6 °F (37 °C) (01/28/23 0400)  Pulse: 147 (01/28/23 0600)  Resp: 77 (01/28/23 0600)  BP: (!) 98/46 (01/28/23 0400)  SpO2: 90 % (01/28/23 0600)   Vital Signs (24h Range):  Temp:  [98 °F (36.7 °C)-98.6 °F (37 °C)] 98.6 °F (37 °C)  Pulse:  [129-177] 147  Resp:  [14-90] 77  SpO2:  [87 %-100 %] 90 %  BP: ()/(32-56) 98/46     Anthropometrics:  Head Circumference: 34 cm  Weight: 3335 g (7 lb 5.6 oz) (reweigh per GAVI Slater nnp request) 90 %ile (Z= 1.29) based on [place] (Boys, 22-50 Weeks) weight-for-age data using vitals from 2023.  Height: 48.3 cm (19") (Filed from Delivery Summary) 76 %ile (Z= 0.71) based on [place] (Boys, 22-50 Weeks) Length-for-age data based on Length recorded on 2023.    Intake/Output - Last 3 Shifts         01/26 0700 01/27 0659 01/27 0700 01/28 0659 01/28 0700 01/29 0659    P.O. 281 325     Total Intake(mL/kg) 281 (83.93) 325 (97.45)     Urine (mL/kg/hr) 199 (2.48) 245 (3.06)     Stool 0 0     Total Output 199 245     Net +82 +80            Urine Occurrence 4 x 3 x     Stool Occurrence 7 x 4 x             Physical Exam  Constitutional:       General: He is active.      Appearance: Normal appearance. He is well-developed.   HENT:      Head: Normocephalic and atraumatic. Anterior fontanelle is flat.      Right Ear: External ear normal.      Left Ear: External ear normal.      Nose: Nose normal.      Mouth/Throat:      Mouth: " Hospitalist Progress Note      PCP: Jean Fabian    Date of Admission: 5/30/2022    Chief Complaint: R flank pain, dysuria. Subjective:     ongoing Right side flank pain radiating into the R buttocks      Medications:  Reviewed    Infusion Medications    sodium chloride      sodium chloride 125 mL/hr at 05/31/22 0522     Scheduled Medications    finasteride  5 mg Oral Daily    montelukast  10 mg Oral Nightly    therapeutic multivitamin-minerals  1 tablet Oral Daily    tamsulosin  0.4 mg Oral Daily    topiramate  50 mg Oral Daily    levofloxacin  750 mg IntraVENous Q24H    sodium chloride flush  5-40 mL IntraVENous 2 times per day    enoxaparin  40 mg SubCUTAneous Daily     PRN Meds: dicyclomine, traMADol, sodium chloride flush, sodium chloride, ondansetron **OR** ondansetron, polyethylene glycol, acetaminophen **OR** acetaminophen      Intake/Output Summary (Last 24 hours) at 5/31/2022 1150  Last data filed at 5/30/2022 2017  Gross per 24 hour   Intake 60 ml   Output --   Net 60 ml       Physical Exam Performed:    /72   Pulse (!) 107   Temp 99.3 °F (37.4 °C) (Oral)   Resp 18   Ht 5' 6\" (1.676 m)   Wt 218 lb 3.2 oz (99 kg)   SpO2 95%   BMI 35.22 kg/m²     General appearance: No apparent distress, appears stated age and cooperative. HEENT: Pupils equal, round, and reactive to light. Conjunctivae/corneas clear. Neck: Supple, with full range of motion. No jugular venous distention. Trachea midline. Respiratory:  Normal respiratory effort. Clear to auscultation, bilaterally without Rales/Wheezes/Rhonchi. Cardiovascular: Regular rate and rhythm with normal S1/S2 without murmurs, rubs or gallops. Abdomen: Soft, non-tender, non-distended with normal bowel sounds. Musculoskeletal: No clubbing, cyanosis or edema bilaterally. Full range of motion without deformity. Skin: Skin color, texture, turgor normal.  No rashes or lesions.   Neurologic:  Neurovascularly intact without any Mucous membranes are moist.      Pharynx: Oropharynx is clear.   Eyes:      General: Red reflex is present bilaterally.      Pupils: Pupils are equal, round, and reactive to light.   Cardiovascular:      Rate and Rhythm: Normal rate and regular rhythm.      Pulses: Normal pulses.   Pulmonary:      Effort: Pulmonary effort is normal.      Breath sounds: Normal breath sounds.   Abdominal:      General: Bowel sounds are normal.      Palpations: Abdomen is soft.   Genitourinary:     Penis: Normal.       Testes: Normal.   Musculoskeletal:         General: Normal range of motion.      Cervical back: Normal range of motion.   Skin:     General: Skin is warm.      Capillary Refill: Capillary refill takes less than 2 seconds.   Neurological:      General: No focal deficit present.      Mental Status: He is alert.      Primitive Reflexes: Suck normal. Symmetric [place].       Ventilator Data (Last 24H):          Recent Labs     23  0511   PH 7.396   PCO2 47.8   PO2 45   HCO3 29.3   POCSATURATED 80*        Lines/Drains:         Laboratory:      Diagnostic Results:        Assessment/Plan:     Palliative Care  * Infant of diabetic mother   This is a 35.3 week GA black male born vaginally after pitocin induction.  Maternal history of Type I diabetes, not always controlled.  MBT O(+)  BBT pending.  At risk for hypoglycemia.  PLAN:  1.  Normal WB cares  2.  22cal formula ad cole q 3 hours po  3.  Glucose protocol    PROGRESS NOTE     Name:  Iam Lyn Boy                      :  2022              BW:  3492gms.  GA:  35.3 wks   Date: 2023                                  DOL: 5                                TW: 3364(+16)g cGA:36.1 wks                                                                 This is a 3492gm black male infant born at 35.3 week gestational age vaginally by Dr. Stallings. Mother is 26 year old G2, P1, L1  who received care by Dr. Stallings. Her prenatal serologies were negative on (2022)  focal sensory/motor deficits. Cranial nerves: II-XII intact, grossly non-focal.  Psychiatric: Alert and oriented, thought content appropriate, normal insight  Capillary Refill: Brisk,3 seconds, normal   Peripheral Pulses: +2 palpable, equal bilaterally       Labs:   Recent Labs     05/30/22  1452 05/31/22  0551   WBC 15.4* 10.1   HGB 13.9 12.5*   HCT 40.8 37.7*    141     Recent Labs     05/30/22  1452 05/31/22  0551    141   K 3.8 4.0    109   CO2 20* 21   BUN 10 7   CREATININE 0.9 0.8*   CALCIUM 8.9 8.4   PHOS  --  1.8*     Recent Labs     05/30/22  1452   AST 14*   ALT 14   BILITOT 1.2*   ALKPHOS 80     No results for input(s): INR in the last 72 hours. No results for input(s): July [place] in the last 72 hours. Urinalysis:      Lab Results   Component Value Date    NITRU Negative 05/30/2022    WBCUA  05/30/2022    BACTERIA 3+ 05/30/2022    RBCUA 5-10 05/30/2022    BLOODU SMALL 05/30/2022    SPECGRAV <=1.005 05/30/2022    GLUCOSEU Negative 05/30/2022       Radiology:  CT ABDOMEN PELVIS W IV CONTRAST Additional Contrast? None   Final Result   Mild bladder wall thickening with surrounding fat stranding, suggesting   underlying cystitis. No stones or hydronephrosis on the right or left      Diverticulosis. No diverticulitis. Appendix is normal in caliber         XR CHEST PORTABLE   Final Result   No significant findings in the chest.                 Assessment/Plan:    Active Hospital Problems    Diagnosis     Pyelonephritis [N12]      Priority: Medium       Acute R Pyelonephritis. On IV levaquin. Follow Cx  Urology consult. Hx of BPH - follows with urology. DVT Prophylaxis: lovenox  Diet: ADULT DIET; Regular  Code Status: Full Code      Dispo -home next 24hrs pending culture results.      Cesar Bills MD with GBS unknown. Maternal Blood Type O +. Her prenatal course was complicated by Type I diabetes, not always controlled.  Apgars 8 and 9 at 1 and 5 minutes of life.  The baby was transferred to NICU for hypoglycemia.     The NICU hospital course as follows:               FEN:  Infant po feeding well, po fed 20 and 25mls in WBN.  PLAN:  22cal formula ad cole q 3 hours, D10 at 60ckd via PIV  1/24Weight 3502 gms.  Infant is stable on radiant warmer, no heat, po fed 75ckd and IVF at 30ckd with uop 2.2ckh and 2 stools.  Plan today continue with ad cole feedings and weaning of ivf  1/25: wt 3411gms, off IVFs, po feeding on demand, fair feeding, taking 30ml q 3hrs and needs encouragement. Mother tried to feed and was only able to get him to take 5ml IN: 87ckd OUT: 3.5cc/kg/hr. Will continue to work on po feeds, stable temp in open crib  1/26: wt 3419gms, still working on po feeds, mom comes in to feed, taking 30ml po q 3hrs, not interested in taking more IN: 71ckd OUT: 2.6cc/kg/hr with 4 stools, lytes stable 1/27 Weight 3348gms.  Infant is stable in crib, po fed 84ckd with good uop and 7 stools.  Plan today ad cole feeding q 3-4 hours po  1/28 Weight 3364(+16)gms.  Infant is stable in crib, po  Fed 97ckd with good uop and 4 stools.  Plan today for mother to room in tonight with iinfant    HYPOGLYCEMIA:  At risk due to maternal history of Type I diabetes.  1st glucose 26mg/dl after feeding.  Glucose gel given.  Plan start IVF 60ckd, D10 7ml (2cc/kg) now and continue ad cole feedings of 22cal formula  1/24 glucose have stabled 50smg/dl last night and able to wean IVF 38ckd this a.m..  Plan continue ad cole feedings and weaning of IVF  1/25: off IVFs and stable glucoses RESOLVED     RESPIRATORY:  Stable in room air, no increase WOB 1/24 Stable in room air, O2 sats 100%, no increase WOB  1/25: room air, no distress 1/26:m no distress 1/27 Stable in room air  1/28 Stable in room air    ID:  No maternal set up, will obtain CBC 1/24  stable clinically, CBC reviewed- RESOLVED      HEME:  follow Hct 1/24 Hct 60% 1/26: H/H 20/60 1/27 start MVI with fe 1 ml po daily  1/28 Stable, MVI with fe daily     HYPERBILIRUBINEMIA:  MBT O(+) BBT pending, PLAN follow bili. 1/24 BBT O(+) Plan follow TcB daily  1/25: TCB 9.5, will follow daily  1/26: TCB 11.6, follow daily 1/27 TcB 11.5, following  1/28 TcB 14, serum ?16, Plan repeat bili and start phototherapy if >13     IMPRESSION:     1. 35.3 week gestation black male infant, aga  2. Maternal IDM  3. Hypoglycemia-resolved   4.  hyperbilirubinemia        PLAN:     1. 22cal formula ad cole q 3-4 hours po  2. MVI with fe 1ml po daily  3. Daily TcB  4. Room in with mother  5. Start phototherapy is bili >13  6. Mon/Thurs labs      Discussed plan of care with parents.     Dr. Abelino Warren/CHRISTOPHER Cevallos-BC            Revision History                                                CHRISTOPHER Moya  Neonatology  [facility] -  NICU

## 2025-01-03 NOTE — PROGRESS NOTES
AM Shift assessment completed. Pt is alert and oriented. Vital signs are WNL. Respirations are even & easy. Patient complaint of R flank pain; abdominal pain; bentyl requested at this time. Will continue to monitor. Needs are met. SR up x 2 and bed in low position. Call light is within reach. Will monitor. .Bedside Mobility Assessment Tool (BMAT):     Assessment Level 1- Sit and Shake    1. From a semi-reclined position, ask patient to sit up and rotate to a seated position at the side of the bed. Can use the bedrail. 2. Ask patient to reach out and grab your hand and shake making sure patient reaches across his/her midline. Pass- Patient is able to come to a seated position, maintain core strength. Maintains seated balance while reaching across midline. Move on to Assessment Level 2. Assessment Level 2- Stretch and Point   1. With patient in seated position at the side of the bed, have patient place both feet on the floor (or stool) with knees no higher than hips. 2. Ask patient to stretch one leg and straighten the knee, then bend the ankle/flex and point the toes. If appropriate, repeat with the other leg. Pass- Patient is able to demonstrate appropriate quad strength on intended weight bearing limb(s). Move onto Assessment Level 3. Assessment Level 3- Stand   1. Ask patient to elevate off the bed or chair (seated to standing) using an assistive device (cane, bedrail). 2. Patient should be able to raise buttocks off be and hold for a count of five. May repeat once. Pass- Patient maintains standing stability for at least 5 seconds, proceed to assessment level 4. Assessment Level 4- Walk   1. Ask patient to march in place at bedside. 2. Then ask patient to advance step and return each foot. Some medical conditions may render a patient from stepping backwards, use your best clinical judgement.    Pass- Patient demonstrates balance while shifting weight and ability to step, takes independent steps, does not use assistive device patient is MOBILITY LEVEL 4.       Mobility Level- 4 NEUROLOGY CONSULT    HPI:  82 female history of Parkinson's, dementia, CAD, HLD, HTN, recurrent UTIs, presenting to the ED for weakness and increased confusion from the nursing home. Patient is currently admitted to under medicine and is being treated for aspiration pneumonia. Neurology is consulted to work up a neurological cause of encephalopathy. Patient was seen and examined at bedside along with her  present who helped with the history. As per the , patient at baseline is wheelchair bound but is able to state her names, identify him and some of the family members although not capable to forming full sentences. Patient was recently discharged to rehab after a humeral fracture and then to nursing home where she was diagnosed and treated for pneumonia with meropenem completed on 12/30. Patient was also treated for UTI a week ago at the nursing home. As per the  patient has been coughing since having pna and was found to be more confused and not talking since last 2 days hence was brought to the ED for further evaluation. Patient is well know to neurology department and has been following up with neuro clinic for management of dementia and PD symptoms.       VITALS:   T(C): 37.2 (01-01-25 @ 15:57), Max: 37.2 (01-01-25 @ 15:57)  HR: 61 (01-01-25 @ 15:57) (61 - 61)  BP: 145/80 (01-01-25 @ 15:57) (145/80 - 145/80)  RR: 17 (01-01-25 @ 15:57) (17 - 17)  SpO2: 94% (01-01-25 @ 15:57) (94% - 94%)    IMAGING:  - CTA Chest PE Protocol 1/1/25: No pulmonary embolus.  - CTAP 1/1/25: Right basilar few consolidative opacities/atelectatic changes. Findings may represent early versus resolving pneumonia in the appropriate clinical setting. Secretions visualized within the right and left main stem bronchus; correlation for aspiration suggested. Mural thickening of proximal small bowel, could represent enteritis in the appropriate clinical setting.  - EKG 1/1/25: NSR    LABS:  - WBC 13.41  - RVP neg   (01 Jan 2025 20:46)     MEDICATIONS  Home Medications:  amantadine 100 mg oral capsule: 1 cap(s) orally 2 times a day (01 Jan 2025 21:43)  amLODIPine 2.5 mg oral tablet: 1 tab(s) orally once a day (01 Jan 2025 21:43)  atorvastatin 40 mg oral tablet: 1 tab(s) orally once a day (01 Jan 2025 21:43)  carbidopa-levodopa 25 mg-100 mg oral tablet: 2 tab(s) orally 3 times a day (01 Jan 2025 21:43)  carbidopa-levodopa 25 mg-100 mg oral tablet, extended release: 1 tab(s) orally once a day (at bedtime) (01 Jan 2025 21:43)  entacapone 200 mg oral tablet: 1 tab(s) orally 3 times a day (01 Jan 2025 21:43)  escitalopram 20 mg oral tablet: 1 tab(s) orally once a day (01 Jan 2025 21:43)  estradiol 0.1 mg/g vaginal cream: 1 application vaginal every 48 hours (01 Jan 2025 21:43)  rivastigmine 1.5 mg oral capsule: 1 cap(s) orally 2 times a day (01 Jan 2025 21:43)  senna leaf extract oral tablet: 2 tab(s) orally once a day (at bedtime) (01 Jan 2025 21:43)    MEDICATIONS  (STANDING):  amantadine 100 milliGRAM(s) Oral two times a day  amLODIPine   Tablet 2.5 milliGRAM(s) Oral daily  atorvastatin 40 milliGRAM(s) Oral at bedtime  carbidopa/levodopa  25/100 2 Tablet(s) Oral three times a day  carbidopa/levodopa CR 25/100 1 Tablet(s) Oral at bedtime  cefepime   IVPB      cefepime   IVPB 1000 milliGRAM(s) IV Intermittent every 12 hours  enoxaparin Injectable 40 milliGRAM(s) SubCutaneous every 24 hours  entacapone 200 milliGRAM(s) Oral three times a day  escitalopram 20 milliGRAM(s) Oral daily  estradiol 0.01% Vaginal Cream 1 Gram(s) Vaginal <User Schedule>  lactated ringers. 1000 milliLiter(s) (50 mL/Hr) IV Continuous <Continuous>  lactated ringers. 1000 milliLiter(s) (50 mL/Hr) IV Continuous <Continuous>  lactated ringers. 1000 milliLiter(s) (50 mL/Hr) IV Continuous <Continuous>  pantoprazole    Tablet 40 milliGRAM(s) Oral before breakfast  senna 2 Tablet(s) Oral at bedtime    MEDICATIONS  (PRN):      FAMILY HISTORY:    SOCIAL HISTORY: negative for tobacco, alcohol, or ilicit drug use.    Allergies    No Known Allergies    Intolerances        GEN: laying in bed and not in acute distress    NEURO:   MENTAL STATUS: unable to access  LANG/SPEECH: unable to access  CRANIAL NERVES:  II: Pupils equal and reactive, no RAPD  III, IV, VI: no gaze preference or deviation  VII: no facial asymmetry  MOTOR/SENSORY: withdraws all 4 limbs to noxious stimulus   REFLEXES: 2/4 throughout, bilateral flexor plantars  No rest tremors noted, no cogwheel rigidity       LABS:                        12.1   11.07 )-----------( 261      ( 03 Jan 2025 06:51 )             37.9     01-03    139  |  102  |  14  ----------------------------<  85  4.1   |  27  |  0.6[L]    Ca    8.5      03 Jan 2025 06:51  Phos  2.6     01-03  Mg     2.2     01-03    TPro  5.4[L]  /  Alb  3.3[L]  /  TBili  1.4[H]  /  DBili  x   /  AST  14  /  ALT  16  /  AlkPhos  87  01-03    Hemoglobin A1C:   Vitamin B12     CAPILLARY BLOOD GLUCOSE          Urinalysis Basic - ( 03 Jan 2025 06:51 )    Color: x / Appearance: x / SG: x / pH: x  Gluc: 85 mg/dL / Ketone: x  / Bili: x / Urobili: x   Blood: x / Protein: x / Nitrite: x   Leuk Esterase: x / RBC: x / WBC x   Sq Epi: x / Non Sq Epi: x / Bacteria: x        ABG - ( 01 Jan 2025 22:24 )  pH, Arterial: 7.45  pH, Blood: x     /  pCO2: 38    /  pO2: 85    / HCO3: 26    / Base Excess: 2.4   /  SaO2: 98.5                Microbiology:    Culture - Blood (collected 01 Jan 2025 20:19)  Source: .Blood BLOOD  Preliminary Report (03 Jan 2025 06:01):    No growth at 24 hours    Culture - Blood (collected 01 Jan 2025 20:19)  Source: .Blood BLOOD  Preliminary Report (03 Jan 2025 06:01):    No growth at 24 hours    Culture - Urine (collected 01 Jan 2025 16:56)  Source: Clean Catch Clean Catch (Midstream)  Preliminary Report (03 Jan 2025 16:37):    10,000 - 49,000 CFU/mL Enterococcus species        RADIOLOGY, EKG AND ADDITIONAL TESTS: Reviewed.

## 2025-03-18 ENCOUNTER — HOSPITAL ENCOUNTER (EMERGENCY)
Age: 55
Discharge: HOME | End: 2025-03-18
Attending: EMERGENCY MEDICINE
Payer: MEDICAID

## 2025-03-18 VITALS
WEIGHT: 224.2 LBS | TEMPERATURE: 97.8 F | SYSTOLIC BLOOD PRESSURE: 153 MMHG | HEIGHT: 67 IN | HEART RATE: 80 BPM | BODY MASS INDEX: 35.19 KG/M2 | DIASTOLIC BLOOD PRESSURE: 91 MMHG | RESPIRATION RATE: 18 BRPM | OXYGEN SATURATION: 98 %

## 2025-03-18 DIAGNOSIS — M79.604 RIGHT LEG PAIN: Primary | ICD-10-CM

## 2025-03-18 PROCEDURE — 99284 EMERGENCY DEPT VISIT MOD MDM: CPT

## 2025-03-18 PROCEDURE — 6360000002 HC RX W HCPCS: Performed by: EMERGENCY MEDICINE

## 2025-03-18 PROCEDURE — 96372 THER/PROPH/DIAG INJ SC/IM: CPT

## 2025-03-18 RX ORDER — KETOROLAC TROMETHAMINE 15 MG/ML
30 INJECTION, SOLUTION INTRAMUSCULAR; INTRAVENOUS ONCE
Status: COMPLETED | OUTPATIENT
Start: 2025-03-18 | End: 2025-03-18

## 2025-03-18 RX ORDER — IBUPROFEN 600 MG/1
600 TABLET, FILM COATED ORAL 3 TIMES DAILY PRN
Qty: 15 TABLET | Refills: 0 | Status: SHIPPED | OUTPATIENT
Start: 2025-03-18

## 2025-03-18 RX ORDER — METHOCARBAMOL 750 MG/1
750 TABLET, FILM COATED ORAL 3 TIMES DAILY PRN
Qty: 30 TABLET | Refills: 0 | Status: SHIPPED | OUTPATIENT
Start: 2025-03-18

## 2025-03-18 RX ADMIN — KETOROLAC TROMETHAMINE 30 MG: 15 INJECTION, SOLUTION INTRAMUSCULAR; INTRAVENOUS at 05:17

## 2025-03-18 ASSESSMENT — PAIN - FUNCTIONAL ASSESSMENT: PAIN_FUNCTIONAL_ASSESSMENT: 0-10

## 2025-03-18 ASSESSMENT — PAIN SCALES - GENERAL: PAINLEVEL_OUTOF10: 9

## 2025-03-18 ASSESSMENT — PAIN DESCRIPTION - LOCATION: LOCATION: LEG

## 2025-03-18 ASSESSMENT — PAIN DESCRIPTION - DESCRIPTORS: DESCRIPTORS: SHOOTING

## 2025-03-18 ASSESSMENT — PAIN DESCRIPTION - ORIENTATION: ORIENTATION: RIGHT

## 2025-03-18 NOTE — ED PROVIDER NOTES
spelling, as well as words and phrases that may be inappropriate. If there are any questions or concerns please feel free to contact the dictating provider for clarification.     Leonard Jorgensen MD   [facility]       Leonard Jorgensen MD  03/18/25 [id number]

## 2025-03-18 NOTE — DISCHARGE INSTRUCTIONS
Rest.  Elevate your leg.  Apply heating packs    Follow-up with your primary care doctor for further evaluation.  Return to the ER with any worsening symptoms or new concerns